# Patient Record
Sex: FEMALE | Race: BLACK OR AFRICAN AMERICAN | Employment: FULL TIME | URBAN - METROPOLITAN AREA
[De-identification: names, ages, dates, MRNs, and addresses within clinical notes are randomized per-mention and may not be internally consistent; named-entity substitution may affect disease eponyms.]

---

## 2017-01-07 ENCOUNTER — LAB ENCOUNTER (OUTPATIENT)
Dept: LAB | Age: 62
End: 2017-01-07
Attending: INTERNAL MEDICINE
Payer: COMMERCIAL

## 2017-01-07 DIAGNOSIS — D72.819 LEUKOPENIA, UNSPECIFIED TYPE: ICD-10-CM

## 2017-01-07 DIAGNOSIS — E87.0 SERUM SODIUM ELEVATED: ICD-10-CM

## 2017-01-07 DIAGNOSIS — N28.9 RENAL INSUFFICIENCY: ICD-10-CM

## 2017-01-07 LAB
ALBUMIN SERPL-MCNC: 4 G/DL (ref 3.5–4.8)
ALP LIVER SERPL-CCNC: 89 U/L (ref 50–130)
ALT SERPL-CCNC: 34 U/L (ref 14–54)
AST SERPL-CCNC: 28 U/L (ref 15–41)
BASOPHILS # BLD AUTO: 0.04 X10(3) UL (ref 0–0.1)
BASOPHILS NFR BLD AUTO: 1.1 %
BILIRUB SERPL-MCNC: 0.8 MG/DL (ref 0.1–2)
BUN BLD-MCNC: 17 MG/DL (ref 8–20)
CALCIUM BLD-MCNC: 9 MG/DL (ref 8.3–10.3)
CHLORIDE: 105 MMOL/L (ref 101–111)
CO2: 28 MMOL/L (ref 22–32)
CREAT BLD-MCNC: 1.28 MG/DL (ref 0.55–1.02)
EOSINOPHIL # BLD AUTO: 0.3 X10(3) UL (ref 0–0.3)
EOSINOPHIL NFR BLD AUTO: 8.5 %
ERYTHROCYTE [DISTWIDTH] IN BLOOD BY AUTOMATED COUNT: 14.3 % (ref 11.5–16)
GLUCOSE BLD-MCNC: 91 MG/DL (ref 70–99)
HCT VFR BLD AUTO: 46.8 % (ref 34–50)
HGB BLD-MCNC: 14.7 G/DL (ref 12–16)
IMMATURE GRANULOCYTE COUNT: 0.01 X10(3) UL (ref 0–1)
IMMATURE GRANULOCYTE RATIO %: 0.3 %
LYMPHOCYTES # BLD AUTO: 1.31 X10(3) UL (ref 0.9–4)
LYMPHOCYTES NFR BLD AUTO: 36.9 %
M PROTEIN MFR SERPL ELPH: 7.3 G/DL (ref 6.1–8.3)
MCH RBC QN AUTO: 28.4 PG (ref 27–33.2)
MCHC RBC AUTO-ENTMCNC: 31.4 G/DL (ref 31–37)
MCV RBC AUTO: 90.3 FL (ref 81–100)
MONOCYTES # BLD AUTO: 0.27 X10(3) UL (ref 0.1–0.6)
MONOCYTES NFR BLD AUTO: 7.6 %
NEUTROPHIL ABS PRELIM: 1.62 X10 (3) UL (ref 1.3–6.7)
NEUTROPHILS # BLD AUTO: 1.62 X10(3) UL (ref 1.3–6.7)
NEUTROPHILS NFR BLD AUTO: 45.6 %
PLATELET # BLD AUTO: 226 10(3)UL (ref 150–450)
POTASSIUM SERPL-SCNC: 4 MMOL/L (ref 3.6–5.1)
RBC # BLD AUTO: 5.18 X10(6)UL (ref 3.8–5.1)
RED CELL DISTRIBUTION WIDTH-SD: 46.9 FL (ref 35.1–46.3)
SODIUM SERPL-SCNC: 141 MMOL/L (ref 136–144)
WBC # BLD AUTO: 3.6 X10(3) UL (ref 4–13)

## 2017-01-07 PROCEDURE — 85025 COMPLETE CBC W/AUTO DIFF WBC: CPT

## 2017-01-07 PROCEDURE — 80053 COMPREHEN METABOLIC PANEL: CPT

## 2017-01-07 PROCEDURE — 36415 COLL VENOUS BLD VENIPUNCTURE: CPT

## 2017-01-09 RX ORDER — ERGOCALCIFEROL 1.25 MG/1
50000 CAPSULE ORAL WEEKLY
Qty: 48 CAPSULE | Refills: 0 | Status: SHIPPED | OUTPATIENT
Start: 2017-01-09 | End: 2017-02-08

## 2017-01-09 NOTE — TELEPHONE ENCOUNTER
[facility] is requesting a 90 day supply for Vit D 50,000IU instead to her local pharmacy. Ok per Dr. Wendy Rao to send a 90 day supply to [facility]. RX sent.     -Called [facility] ,spoke with Helio Whitlock, to cancel RX sent on 12/13/16.

## 2017-01-10 ENCOUNTER — PATIENT MESSAGE (OUTPATIENT)
Dept: INTERNAL MEDICINE CLINIC | Facility: CLINIC | Age: 62
End: 2017-01-10

## 2017-01-10 DIAGNOSIS — D72.819 LEUKOPENIA, UNSPECIFIED TYPE: Primary | ICD-10-CM

## 2017-01-13 NOTE — TELEPHONE ENCOUNTER
Called pt to inform confirmed with [facility] still available and will be ready for pick this weekend. Pt verbalized understanding, stating will  meds and start.    Pt requesting remaining 90 day refill be re[facility] and mo

## 2017-01-13 NOTE — TELEPHONE ENCOUNTER
[phone]   for pt (Nicholas Pedraza per HIPAA) inquiring pt's mychart reply \"For some reason the new perscription never made it to [facility]. I have not started the new medication. \"  Noted BP rx's sent to [facility] pharmacy on 12/19/16- Is pt aware and

## 2017-01-16 RX ORDER — BUPROPION HYDROCHLORIDE 300 MG/1
TABLET ORAL
Qty: 90 TABLET | Refills: 0 | Status: SHIPPED | OUTPATIENT
Start: 2017-01-16 | End: 2017-04-14

## 2017-01-16 NOTE — TELEPHONE ENCOUNTER
LM for pt at [facility] [phone] (H) cancelling appt for today 1/16/17, asking pt to see email response to hers and asking pt to call back to schedule.

## 2017-01-16 NOTE — TELEPHONE ENCOUNTER
Called pt's cell number. Informed her info listed below. Pt scheduled 3 weeks from now.    Future Appointments  Date Time Provider [facility]       [facility]   2/6/2017 9:45 AM Robert Ott MD [facility] IM

## 2017-01-17 ENCOUNTER — OFFICE VISIT (OUTPATIENT)
Dept: INTERNAL MEDICINE CLINIC | Facility: CLINIC | Age: 62
End: 2017-01-17

## 2017-01-17 VITALS
DIASTOLIC BLOOD PRESSURE: 76 MMHG | WEIGHT: 207 LBS | SYSTOLIC BLOOD PRESSURE: 122 MMHG | BODY MASS INDEX: 31.37 KG/M2 | RESPIRATION RATE: 16 BRPM | HEIGHT: 68 IN | HEART RATE: 78 BPM

## 2017-01-17 DIAGNOSIS — R79.89 ELEVATED SERUM CREATININE: ICD-10-CM

## 2017-01-17 DIAGNOSIS — Z51.81 ENCOUNTER FOR THERAPEUTIC DRUG MONITORING: Primary | ICD-10-CM

## 2017-01-17 DIAGNOSIS — E66.9 OBESITY (BMI 30-39.9): ICD-10-CM

## 2017-01-17 PROCEDURE — 99213 OFFICE O/P EST LOW 20 MIN: CPT | Performed by: INTERNAL MEDICINE

## 2017-01-17 RX ORDER — PHENTERMINE HYDROCHLORIDE 37.5 MG/1
37.5 TABLET ORAL
Qty: 30 TABLET | Refills: 0 | Status: SHIPPED | OUTPATIENT
Start: 2017-01-17 | End: 2017-02-14

## 2017-01-17 RX ORDER — TOPIRAMATE 25 MG/1
25 TABLET ORAL 2 TIMES DAILY
Qty: 60 TABLET | Refills: 5 | Status: SHIPPED | OUTPATIENT
Start: 2017-01-17 | End: 2017-03-14

## 2017-01-17 NOTE — PROGRESS NOTES
CC: Patient presents with:  Weight Check: down 2 lb       HPI:   Obesity, doing well on phentermine. Working out daily at work. Worsening cravings.        Current Outpatient Prescriptions:  Phentermine HCl 37.5 MG Oral Tab Take 1 tablet (37.5 mg total bilat   CARDIO: RRR without murmur      No orders of the defined types were placed in this encounter.         Signed Prescriptions Disp Refills    Phentermine HCl 37.5 MG Oral Tab 30 tablet 0      Sig: Take 1 tablet (37.5 mg total) by mouth every morning be

## 2017-02-04 ENCOUNTER — LAB ENCOUNTER (OUTPATIENT)
Dept: LAB | Age: 62
End: 2017-02-04
Attending: INTERNAL MEDICINE
Payer: COMMERCIAL

## 2017-02-04 DIAGNOSIS — Z13.0 SCREENING FOR BLOOD DISEASE: ICD-10-CM

## 2017-02-04 DIAGNOSIS — Z00.00 ROUTINE GENERAL MEDICAL EXAMINATION AT A HEALTH CARE FACILITY: ICD-10-CM

## 2017-02-04 DIAGNOSIS — Z13.220 SCREENING FOR LIPOID DISORDERS: ICD-10-CM

## 2017-02-04 DIAGNOSIS — D72.819 LEUKOPENIA, UNSPECIFIED TYPE: ICD-10-CM

## 2017-02-04 DIAGNOSIS — Z13.228 SCREENING FOR METABOLIC DISORDER: ICD-10-CM

## 2017-02-04 LAB
ALBUMIN SERPL-MCNC: 3.9 G/DL (ref 3.5–4.8)
ALP LIVER SERPL-CCNC: 100 U/L (ref 50–130)
ALT SERPL-CCNC: 31 U/L (ref 14–54)
AST SERPL-CCNC: 18 U/L (ref 15–41)
BASOPHILS # BLD AUTO: 0.07 X10(3) UL (ref 0–0.1)
BASOPHILS NFR BLD AUTO: 2.2 %
BILIRUB SERPL-MCNC: 0.7 MG/DL (ref 0.1–2)
BUN BLD-MCNC: 14 MG/DL (ref 8–20)
CALCIUM BLD-MCNC: 8.9 MG/DL (ref 8.3–10.3)
CHLORIDE: 108 MMOL/L (ref 101–111)
CHOLEST SMN-MCNC: 169 MG/DL (ref ?–200)
CO2: 27 MMOL/L (ref 22–32)
CREAT BLD-MCNC: 1.36 MG/DL (ref 0.55–1.02)
EOSINOPHIL # BLD AUTO: 0.22 X10(3) UL (ref 0–0.3)
EOSINOPHIL NFR BLD AUTO: 6.8 %
ERYTHROCYTE [DISTWIDTH] IN BLOOD BY AUTOMATED COUNT: 14.8 % (ref 11.5–16)
GLUCOSE BLD-MCNC: 83 MG/DL (ref 70–99)
HCT VFR BLD AUTO: 46.3 % (ref 34–50)
HDLC SERPL-MCNC: 70 MG/DL (ref 45–?)
HDLC SERPL: 2.41 {RATIO} (ref ?–4.44)
HGB BLD-MCNC: 14.4 G/DL (ref 12–16)
IMMATURE GRANULOCYTE COUNT: 0.01 X10(3) UL (ref 0–1)
IMMATURE GRANULOCYTE RATIO %: 0.3 %
LDLC SERPL CALC-MCNC: 92 MG/DL (ref ?–130)
LYMPHOCYTES # BLD AUTO: 0.97 X10(3) UL (ref 0.9–4)
LYMPHOCYTES NFR BLD AUTO: 30 %
M PROTEIN MFR SERPL ELPH: 7.1 G/DL (ref 6.1–8.3)
MCH RBC QN AUTO: 28.6 PG (ref 27–33.2)
MCHC RBC AUTO-ENTMCNC: 31.1 G/DL (ref 31–37)
MCV RBC AUTO: 91.9 FL (ref 81–100)
MONOCYTES # BLD AUTO: 0.27 X10(3) UL (ref 0.1–0.6)
MONOCYTES NFR BLD AUTO: 8.4 %
NEUTROPHIL ABS PRELIM: 1.69 X10 (3) UL (ref 1.3–6.7)
NEUTROPHILS # BLD AUTO: 1.69 X10(3) UL (ref 1.3–6.7)
NEUTROPHILS NFR BLD AUTO: 52.3 %
NONHDLC SERPL-MCNC: 99 MG/DL (ref ?–130)
PLATELET # BLD AUTO: 212 10(3)UL (ref 150–450)
POTASSIUM SERPL-SCNC: 4.1 MMOL/L (ref 3.6–5.1)
RBC # BLD AUTO: 5.04 X10(6)UL (ref 3.8–5.1)
RED CELL DISTRIBUTION WIDTH-SD: 49.7 FL (ref 35.1–46.3)
SODIUM SERPL-SCNC: 141 MMOL/L (ref 136–144)
TRIGLYCERIDES: 34 MG/DL (ref ?–150)
VLDL: 7 MG/DL (ref 5–40)
WBC # BLD AUTO: 3.2 X10(3) UL (ref 4–13)

## 2017-02-04 PROCEDURE — 85025 COMPLETE CBC W/AUTO DIFF WBC: CPT

## 2017-02-04 PROCEDURE — 80061 LIPID PANEL: CPT

## 2017-02-04 PROCEDURE — 80053 COMPREHEN METABOLIC PANEL: CPT

## 2017-02-04 PROCEDURE — 36415 COLL VENOUS BLD VENIPUNCTURE: CPT

## 2017-02-10 RX ORDER — TOLTERODINE 4 MG/1
CAPSULE, EXTENDED RELEASE ORAL
Qty: 90 CAPSULE | Refills: 1 | Status: SHIPPED | OUTPATIENT
Start: 2017-02-10 | End: 2017-08-08

## 2017-02-10 NOTE — TELEPHONE ENCOUNTER
LOV: 12/19/16  Future office visit: 12/13/17  Last labs: 2/4/17 Cmp Cbc Lipid  Last RX: 11/11/16 #90 No Refills  Per protocol: Route to Provider

## 2017-02-13 ENCOUNTER — TELEPHONE (OUTPATIENT)
Dept: INTERNAL MEDICINE CLINIC | Facility: CLINIC | Age: 62
End: 2017-02-13

## 2017-02-13 ENCOUNTER — LAB ENCOUNTER (OUTPATIENT)
Dept: LAB | Age: 62
End: 2017-02-13
Attending: INTERNAL MEDICINE
Payer: COMMERCIAL

## 2017-02-13 ENCOUNTER — OFFICE VISIT (OUTPATIENT)
Dept: INTERNAL MEDICINE CLINIC | Facility: CLINIC | Age: 62
End: 2017-02-13

## 2017-02-13 VITALS
WEIGHT: 202.38 LBS | SYSTOLIC BLOOD PRESSURE: 128 MMHG | HEART RATE: 72 BPM | HEIGHT: 68 IN | BODY MASS INDEX: 30.67 KG/M2 | RESPIRATION RATE: 16 BRPM | DIASTOLIC BLOOD PRESSURE: 80 MMHG | OXYGEN SATURATION: 99 % | TEMPERATURE: 98 F

## 2017-02-13 DIAGNOSIS — M79.672 LEFT FOOT PAIN: ICD-10-CM

## 2017-02-13 DIAGNOSIS — I10 ESSENTIAL HYPERTENSION: Primary | ICD-10-CM

## 2017-02-13 DIAGNOSIS — D72.819 LEUKOPENIA, UNSPECIFIED TYPE: ICD-10-CM

## 2017-02-13 DIAGNOSIS — I71.4 ABDOMINAL AORTIC ANEURYSM (AAA) WITHOUT RUPTURE (HCC): ICD-10-CM

## 2017-02-13 DIAGNOSIS — E53.8 LOW SERUM VITAMIN B12: ICD-10-CM

## 2017-02-13 LAB
25-HYDROXYVITAMIN D (TOTAL): 51.4 NG/ML (ref 30–100)
HAV AB SERPL IA-ACNC: 371 PG/ML (ref 193–986)

## 2017-02-13 PROCEDURE — 86255 FLUORESCENT ANTIBODY SCREEN: CPT

## 2017-02-13 PROCEDURE — 99214 OFFICE O/P EST MOD 30 MIN: CPT | Performed by: INTERNAL MEDICINE

## 2017-02-13 PROCEDURE — 82607 VITAMIN B-12: CPT

## 2017-02-13 PROCEDURE — 82784 ASSAY IGA/IGD/IGG/IGM EACH: CPT

## 2017-02-13 PROCEDURE — 83516 IMMUNOASSAY NONANTIBODY: CPT

## 2017-02-13 PROCEDURE — 82306 VITAMIN D 25 HYDROXY: CPT

## 2017-02-13 RX ORDER — HYDROCHLOROTHIAZIDE 12.5 MG/1
12.5 TABLET ORAL DAILY
Qty: 90 TABLET | Refills: 3 | Status: SHIPPED | OUTPATIENT
Start: 2017-02-13 | End: 2017-02-14

## 2017-02-13 NOTE — PROGRESS NOTES
HPI:    Patient ID: Rafael Torres is a 57year old female.     HPI  Here for htn fu and ckd creat better on lower dose hctz, overdue for us AAA, leukopenia seeing heme in past, co left foot dorsal pain at night, not during day, not reproducible, no weakne round, and reactive to light. Neck: Neck supple. Cardiovascular: Normal rate and regular rhythm. Pulmonary/Chest: Effort normal and breath sounds normal. She has no wheezes. Musculoskeletal: She exhibits no edema.    Neurological: She is oriented t

## 2017-02-13 NOTE — TELEPHONE ENCOUNTER
Received a refill request from [facility] for lisinopril and hydrochlorothiazide.  Just want to make sure she wants it to go there because she has refills at [facility] on file

## 2017-02-14 ENCOUNTER — OFFICE VISIT (OUTPATIENT)
Dept: INTERNAL MEDICINE CLINIC | Facility: CLINIC | Age: 62
End: 2017-02-14

## 2017-02-14 VITALS
RESPIRATION RATE: 16 BRPM | DIASTOLIC BLOOD PRESSURE: 60 MMHG | HEIGHT: 68 IN | HEART RATE: 84 BPM | SYSTOLIC BLOOD PRESSURE: 118 MMHG | BODY MASS INDEX: 30.92 KG/M2 | WEIGHT: 204 LBS

## 2017-02-14 DIAGNOSIS — E66.9 OBESITY (BMI 30-39.9): ICD-10-CM

## 2017-02-14 DIAGNOSIS — Z51.81 ENCOUNTER FOR THERAPEUTIC DRUG MONITORING: Primary | ICD-10-CM

## 2017-02-14 LAB — IMMUNOGLOBULIN A: 177 MG/DL (ref 70–312)

## 2017-02-14 PROCEDURE — 99213 OFFICE O/P EST LOW 20 MIN: CPT | Performed by: INTERNAL MEDICINE

## 2017-02-14 RX ORDER — TOPIRAMATE 50 MG/1
50 TABLET, FILM COATED ORAL 2 TIMES DAILY
Qty: 60 TABLET | Refills: 5 | Status: SHIPPED | OUTPATIENT
Start: 2017-02-14 | End: 2017-03-14

## 2017-02-14 RX ORDER — PHENTERMINE HYDROCHLORIDE 37.5 MG/1
37.5 TABLET ORAL
Qty: 30 TABLET | Refills: 0 | Status: SHIPPED | OUTPATIENT
Start: 2017-02-14 | End: 2017-03-14

## 2017-02-14 NOTE — PROGRESS NOTES
CC: Patient presents with:  Weight Check: down 3 lb       HPI:   Obesity, doing well on phentermine and topamax, still worsening hunger and cravings. No chest pain.        Current Outpatient Prescriptions:  hydrochlorothiazide 12.5 MG Oral Tab Take 1 ta PERRLA  LUNGS: clear to auscultation bilat   CARDIO: RRR without murmur    No orders of the defined types were placed in this encounter.         Signed Prescriptions Disp Refills    Phentermine HCl 37.5 MG Oral Tab 30 tablet 0      Sig: Take 1 tablet (37.5

## 2017-02-16 ENCOUNTER — PATIENT MESSAGE (OUTPATIENT)
Dept: INTERNAL MEDICINE CLINIC | Facility: CLINIC | Age: 62
End: 2017-02-16

## 2017-02-16 DIAGNOSIS — D70.9 NEUTROPENIA, UNSPECIFIED TYPE (HCC): Primary | ICD-10-CM

## 2017-02-16 NOTE — TELEPHONE ENCOUNTER
From: Melany Cisneros  To: Vonnie Whitfield MD  Sent: 2/16/2017 10:22 AM CST  Subject: Visit Follow-up Question    Dr. Allison Sidhu,    I contacted Dr. Debbie Steele as you suggested about my white blood cell count.  She recommends I get retested in a month and if th

## 2017-02-17 LAB
GLIADIN PEPTIDE ANTIBODY, IGA DEAMIDATED: 5 UNITS
GLIADIN PEPTIDE ANTIBODY, IGG DEAMIDATED: 4 UNITS

## 2017-02-20 RX ORDER — LISINOPRIL 20 MG/1
20 TABLET ORAL DAILY
Qty: 90 TABLET | Refills: 1 | Status: SHIPPED | OUTPATIENT
Start: 2017-02-20 | End: 2017-07-31

## 2017-02-21 LAB — TISSUE TRANSGLUTAMINASE AB,IGA: 1.7 U/ML (ref ?–15)

## 2017-02-23 ENCOUNTER — TELEPHONE (OUTPATIENT)
Dept: INTERNAL MEDICINE CLINIC | Facility: CLINIC | Age: 62
End: 2017-02-23

## 2017-02-23 DIAGNOSIS — I71.4 ABDOMINAL AORTIC ANEURYSM (AAA) WITHOUT RUPTURE (HCC): Primary | ICD-10-CM

## 2017-02-25 ENCOUNTER — HOSPITAL ENCOUNTER (OUTPATIENT)
Dept: ULTRASOUND IMAGING | Age: 62
Discharge: HOME | End: 2017-02-25
Attending: INTERNAL MEDICINE
Payer: COMMERCIAL

## 2017-02-25 DIAGNOSIS — I71.4 ABDOMINAL AORTIC ANEURYSM (AAA) WITHOUT RUPTURE (HCC): ICD-10-CM

## 2017-02-25 PROCEDURE — 76706 US ABDL AORTA SCREEN AAA: CPT

## 2017-02-27 ENCOUNTER — PATIENT MESSAGE (OUTPATIENT)
Dept: INTERNAL MEDICINE CLINIC | Facility: CLINIC | Age: 62
End: 2017-02-27

## 2017-02-27 NOTE — TELEPHONE ENCOUNTER
From: Brittney Leal  To:  Genesis Partida MD  Sent: 2/27/2017 12:29 PM CST  Subject: Visit Follow-up Question     Referral for the test below:      [facility] ([address]) De[facility] HEMATOLOGY/ONCOL

## 2017-03-14 ENCOUNTER — TELEPHONE (OUTPATIENT)
Dept: INTERNAL MEDICINE CLINIC | Facility: CLINIC | Age: 62
End: 2017-03-14

## 2017-03-14 ENCOUNTER — OFFICE VISIT (OUTPATIENT)
Dept: INTERNAL MEDICINE CLINIC | Facility: CLINIC | Age: 62
End: 2017-03-14

## 2017-03-14 VITALS
HEART RATE: 78 BPM | DIASTOLIC BLOOD PRESSURE: 76 MMHG | WEIGHT: 199 LBS | BODY MASS INDEX: 30.16 KG/M2 | RESPIRATION RATE: 16 BRPM | SYSTOLIC BLOOD PRESSURE: 122 MMHG | HEIGHT: 68 IN

## 2017-03-14 DIAGNOSIS — E66.9 OBESITY (BMI 30-39.9): ICD-10-CM

## 2017-03-14 DIAGNOSIS — Z51.81 ENCOUNTER FOR THERAPEUTIC DRUG MONITORING: Primary | ICD-10-CM

## 2017-03-14 PROCEDURE — 99213 OFFICE O/P EST LOW 20 MIN: CPT | Performed by: INTERNAL MEDICINE

## 2017-03-14 RX ORDER — PHENTERMINE HYDROCHLORIDE 37.5 MG/1
37.5 TABLET ORAL
Qty: 30 TABLET | Refills: 0 | Status: SHIPPED | OUTPATIENT
Start: 2017-03-14 | End: 2017-04-07

## 2017-03-14 NOTE — PROGRESS NOTES
CC: Patient presents with:  Weight Check: down 5 lb       HPI:   Obesity, doing well on phentermine. Stopped topamax due to blurry vision. Worsening hunger.        Current Outpatient Prescriptions:  Phentermine HCl 37.5 MG Oral Tab Take 1 tablet (37.5 encounter.         Signed Prescriptions Disp Refills    Phentermine HCl 37.5 MG Oral Tab 30 tablet 0      Sig: Take 1 tablet (37.5 mg total) by mouth every morning before breakfast.      Lorcaserin HCl ER (BELVIQ XR) 20 MG Oral Tablet 24 Hr 30 tablet 0

## 2017-03-15 NOTE — TELEPHONE ENCOUNTER
Left a VM (at preferred number  [phone] )-  Awaiting for Pt to call back to go over recommendations.

## 2017-03-16 PROBLEM — R29.898 POPLITEAL FULLNESS: Status: ACTIVE | Noted: 2017-03-16

## 2017-03-18 ENCOUNTER — LAB ENCOUNTER (OUTPATIENT)
Dept: LAB | Age: 62
End: 2017-03-18
Attending: INTERNAL MEDICINE
Payer: COMMERCIAL

## 2017-03-18 DIAGNOSIS — D70.9 NEUTROPENIA, UNSPECIFIED TYPE (HCC): ICD-10-CM

## 2017-03-18 LAB
BASOPHILS # BLD AUTO: 0.04 X10(3) UL (ref 0–0.1)
BASOPHILS NFR BLD AUTO: 1.3 %
EOSINOPHIL # BLD AUTO: 0.15 X10(3) UL (ref 0–0.3)
EOSINOPHIL NFR BLD AUTO: 4.8 %
ERYTHROCYTE [DISTWIDTH] IN BLOOD BY AUTOMATED COUNT: 14.6 % (ref 11.5–16)
HCT VFR BLD AUTO: 46.1 % (ref 34–50)
HGB BLD-MCNC: 14.5 G/DL (ref 12–16)
IMMATURE GRANULOCYTE COUNT: 0 X10(3) UL (ref 0–1)
IMMATURE GRANULOCYTE RATIO %: 0 %
LYMPHOCYTES # BLD AUTO: 1.1 X10(3) UL (ref 0.9–4)
LYMPHOCYTES NFR BLD AUTO: 35.3 %
MCH RBC QN AUTO: 29 PG (ref 27–33.2)
MCHC RBC AUTO-ENTMCNC: 31.5 G/DL (ref 31–37)
MCV RBC AUTO: 92.2 FL (ref 81–100)
MONOCYTES # BLD AUTO: 0.24 X10(3) UL (ref 0.1–0.6)
MONOCYTES NFR BLD AUTO: 7.7 %
NEUTROPHIL ABS PRELIM: 1.59 X10 (3) UL (ref 1.3–6.7)
NEUTROPHILS # BLD AUTO: 1.59 X10(3) UL (ref 1.3–6.7)
NEUTROPHILS NFR BLD AUTO: 50.9 %
PLATELET # BLD AUTO: 222 10(3)UL (ref 150–450)
RBC # BLD AUTO: 5 X10(6)UL (ref 3.8–5.1)
RED CELL DISTRIBUTION WIDTH-SD: 49.3 FL (ref 35.1–46.3)
WBC # BLD AUTO: 3.1 X10(3) UL (ref 4–13)

## 2017-03-18 PROCEDURE — 36415 COLL VENOUS BLD VENIPUNCTURE: CPT

## 2017-03-18 PROCEDURE — 85025 COMPLETE CBC W/AUTO DIFF WBC: CPT

## 2017-03-21 ENCOUNTER — TELEPHONE (OUTPATIENT)
Dept: HEMATOLOGY/ONCOLOGY | Facility: HOSPITAL | Age: 62
End: 2017-03-21

## 2017-03-21 NOTE — TELEPHONE ENCOUNTER
Marlyn Booth MD  P Ed[facility] Brenda Rosales Rns                     Call, plt are stable. WBC bit low. Fredrick repeat cbc 1 month      Unable to reach pt by phone, left VM requesting pt to call back RN triage line.

## 2017-03-23 ENCOUNTER — APPOINTMENT (OUTPATIENT)
Dept: HEMATOLOGY/ONCOLOGY | Age: 62
End: 2017-03-23
Attending: INTERNAL MEDICINE
Payer: COMMERCIAL

## 2017-04-07 ENCOUNTER — OFFICE VISIT (OUTPATIENT)
Dept: INTERNAL MEDICINE CLINIC | Facility: CLINIC | Age: 62
End: 2017-04-07

## 2017-04-07 VITALS
DIASTOLIC BLOOD PRESSURE: 70 MMHG | BODY MASS INDEX: 29.86 KG/M2 | WEIGHT: 197 LBS | HEIGHT: 68 IN | HEART RATE: 88 BPM | SYSTOLIC BLOOD PRESSURE: 122 MMHG | RESPIRATION RATE: 16 BRPM

## 2017-04-07 DIAGNOSIS — E66.9 OBESITY (BMI 30-39.9): ICD-10-CM

## 2017-04-07 DIAGNOSIS — Z51.81 ENCOUNTER FOR THERAPEUTIC DRUG MONITORING: Primary | ICD-10-CM

## 2017-04-07 DIAGNOSIS — E53.8 VITAMIN B12 DEFICIENCY: ICD-10-CM

## 2017-04-07 PROCEDURE — 99213 OFFICE O/P EST LOW 20 MIN: CPT | Performed by: INTERNAL MEDICINE

## 2017-04-07 PROCEDURE — 96372 THER/PROPH/DIAG INJ SC/IM: CPT | Performed by: INTERNAL MEDICINE

## 2017-04-07 RX ORDER — PHENTERMINE HYDROCHLORIDE 37.5 MG/1
37.5 TABLET ORAL
Qty: 30 TABLET | Refills: 0 | Status: SHIPPED | OUTPATIENT
Start: 2017-04-07 | End: 2017-08-08

## 2017-04-07 RX ORDER — METFORMIN HYDROCHLORIDE 750 MG/1
750 TABLET, EXTENDED RELEASE ORAL
Qty: 30 TABLET | Refills: 5 | Status: SHIPPED | OUTPATIENT
Start: 2017-04-07 | End: 2017-09-12

## 2017-04-07 RX ORDER — CYANOCOBALAMIN 1000 UG/ML
1000 INJECTION INTRAMUSCULAR; SUBCUTANEOUS ONCE
Status: COMPLETED | OUTPATIENT
Start: 2017-04-07 | End: 2017-04-07

## 2017-04-07 RX ORDER — ERGOCALCIFEROL 1.25 MG/1
CAPSULE ORAL
COMMUNITY
Start: 2017-03-22 | End: 2017-10-27

## 2017-04-07 RX ADMIN — CYANOCOBALAMIN 1000 MCG: 1000 INJECTION INTRAMUSCULAR; SUBCUTANEOUS at 11:10:00

## 2017-04-07 NOTE — PROGRESS NOTES
CC: Patient presents with:  Weight Check: down 2 lb       HPI:   Obesity, doing well on phentermine and belviq. Doesn't feel belviq is helping much. No chest pain.        Current Outpatient Prescriptions:  Phentermine HCl 37.5 MG Oral Tab Take 1 tablet 122/70 mmHg  Pulse 88  Resp 16  Ht 68\"  Wt 197 lb  BMI 29.96 kg/m2  GENERAL: well developed, well nourished and in no apparent distress, A/O x3  HEENT: PERRLA  LUNGS: clear to auscultation bilat   CARDIO: RRR without murmur  GI: +BS's    No orders of the

## 2017-04-11 ENCOUNTER — TELEPHONE (OUTPATIENT)
Dept: INTERNAL MEDICINE CLINIC | Facility: CLINIC | Age: 62
End: 2017-04-11

## 2017-04-17 RX ORDER — BUPROPION HYDROCHLORIDE 300 MG/1
TABLET ORAL
Qty: 90 TABLET | Refills: 0 | Status: SHIPPED | OUTPATIENT
Start: 2017-04-17 | End: 2017-07-16

## 2017-04-17 NOTE — TELEPHONE ENCOUNTER
LOV: 2/13/17  Future office visit: 5/15/17   Last labs: 2/4/17 Cmp Cbc Lipid   Last RX: 1/16/17 #90 No Refills  Per protocol: Route to provider

## 2017-04-21 ENCOUNTER — NURSE ONLY (OUTPATIENT)
Dept: HEMATOLOGY/ONCOLOGY | Age: 62
End: 2017-04-21
Attending: INTERNAL MEDICINE
Payer: COMMERCIAL

## 2017-04-21 ENCOUNTER — TELEPHONE (OUTPATIENT)
Dept: HEMATOLOGY/ONCOLOGY | Facility: HOSPITAL | Age: 62
End: 2017-04-21

## 2017-04-21 DIAGNOSIS — D72.819 LEUKOPENIA, UNSPECIFIED TYPE: ICD-10-CM

## 2017-04-21 PROCEDURE — 36415 COLL VENOUS BLD VENIPUNCTURE: CPT

## 2017-04-21 PROCEDURE — 85025 COMPLETE CBC W/AUTO DIFF WBC: CPT

## 2017-04-21 NOTE — TELEPHONE ENCOUNTER
Keely Davis MD  P [facility] Bcn Bobby Rns                     Call, wbc normal, may repeat cbc in 4-6 months       Left VM with instructions and number to call and schedule an appointment.

## 2017-05-15 ENCOUNTER — OFFICE VISIT (OUTPATIENT)
Dept: INTERNAL MEDICINE CLINIC | Facility: CLINIC | Age: 62
End: 2017-05-15

## 2017-05-15 VITALS
HEIGHT: 68 IN | RESPIRATION RATE: 16 BRPM | HEART RATE: 74 BPM | TEMPERATURE: 98 F | WEIGHT: 189 LBS | DIASTOLIC BLOOD PRESSURE: 84 MMHG | SYSTOLIC BLOOD PRESSURE: 114 MMHG | BODY MASS INDEX: 28.64 KG/M2

## 2017-05-15 DIAGNOSIS — I71.4 ABDOMINAL AORTIC ANEURYSM (AAA) WITHOUT RUPTURE (HCC): ICD-10-CM

## 2017-05-15 DIAGNOSIS — Z00.00 ROUTINE GENERAL MEDICAL EXAMINATION AT A HEALTH CARE FACILITY: Primary | ICD-10-CM

## 2017-05-15 DIAGNOSIS — I10 ESSENTIAL HYPERTENSION: ICD-10-CM

## 2017-05-15 DIAGNOSIS — E53.8 B12 DEFICIENCY: ICD-10-CM

## 2017-05-15 DIAGNOSIS — Z12.39 BREAST CANCER SCREENING: ICD-10-CM

## 2017-05-15 PROCEDURE — 99396 PREV VISIT EST AGE 40-64: CPT | Performed by: INTERNAL MEDICINE

## 2017-05-15 PROCEDURE — 99212 OFFICE O/P EST SF 10 MIN: CPT | Performed by: INTERNAL MEDICINE

## 2017-05-15 NOTE — PROGRESS NOTES
HPI:    Patient ID: Preston Velazquez is a 57year old female.     HPI  Here for pe, no acute complaints, working hard on wt loss with wt loss clinic, ho AAA has seen pv, recomending yearly us, states hearing is decreasing, wants to see an audiologist, also, oriented to person, place, and time. She appears well-developed and well-nourished. HENT:   Head: Normocephalic and atraumatic. Right Ear: External ear normal.   Left Ear: External ear normal.   Mouth/Throat: No oropharyngeal exudate.    Eyes: Pupils ar

## 2017-05-16 ENCOUNTER — TELEPHONE (OUTPATIENT)
Dept: INTERNAL MEDICINE CLINIC | Facility: CLINIC | Age: 62
End: 2017-05-16

## 2017-05-16 ENCOUNTER — OFFICE VISIT (OUTPATIENT)
Dept: INTERNAL MEDICINE CLINIC | Facility: CLINIC | Age: 62
End: 2017-05-16

## 2017-05-16 VITALS
SYSTOLIC BLOOD PRESSURE: 122 MMHG | WEIGHT: 191 LBS | HEART RATE: 78 BPM | DIASTOLIC BLOOD PRESSURE: 78 MMHG | HEIGHT: 68 IN | RESPIRATION RATE: 16 BRPM | BODY MASS INDEX: 28.95 KG/M2

## 2017-05-16 DIAGNOSIS — E53.8 VITAMIN B 12 DEFICIENCY: ICD-10-CM

## 2017-05-16 DIAGNOSIS — Z51.81 ENCOUNTER FOR THERAPEUTIC DRUG MONITORING: Primary | ICD-10-CM

## 2017-05-16 DIAGNOSIS — E66.9 OBESITY (BMI 30-39.9): ICD-10-CM

## 2017-05-16 PROCEDURE — 99213 OFFICE O/P EST LOW 20 MIN: CPT | Performed by: INTERNAL MEDICINE

## 2017-05-16 PROCEDURE — 96372 THER/PROPH/DIAG INJ SC/IM: CPT | Performed by: INTERNAL MEDICINE

## 2017-05-16 RX ORDER — PHENTERMINE HYDROCHLORIDE 37.5 MG/1
37.5 TABLET ORAL
Qty: 30 TABLET | Refills: 0 | Status: CANCELLED | OUTPATIENT
Start: 2017-05-16

## 2017-05-16 RX ORDER — PHENTERMINE HYDROCHLORIDE 15 MG/1
15 CAPSULE ORAL EVERY MORNING
Qty: 30 CAPSULE | Refills: 2 | Status: SHIPPED | OUTPATIENT
Start: 2017-05-16 | End: 2017-09-12

## 2017-05-16 RX ORDER — CYANOCOBALAMIN 1000 UG/ML
1000 INJECTION INTRAMUSCULAR; SUBCUTANEOUS ONCE
Status: COMPLETED | OUTPATIENT
Start: 2017-05-16 | End: 2017-05-16

## 2017-05-16 RX ADMIN — CYANOCOBALAMIN 1000 MCG: 1000 INJECTION INTRAMUSCULAR; SUBCUTANEOUS at 13:29:00

## 2017-05-16 NOTE — PROGRESS NOTES
CC: Patient presents with:  Weight Check: down 6 lb       HPI:   Obesity, doing well on phentermine and metformin. No chest pain. Working out well.        Current Outpatient Prescriptions:  Phentermine HCl 15 MG Oral Cap Take 1 capsule (15 mg total) by in no apparent distress, A/O x3  HEENT:  PERRLA  LUNGS: clear to auscultation bilat   CARDIO: RRR without murmur    No orders of the defined types were placed in this encounter.         Signed Prescriptions Disp Refills    Phentermine HCl 15 MG Oral Cap 30

## 2017-05-16 NOTE — TELEPHONE ENCOUNTER
Per JL- Called Pt to inform that VERONICA recommends Dr Ubaldo Wright in [facility]  Pt verbalized understanding and had no further questions.

## 2017-06-07 ENCOUNTER — PATIENT MESSAGE (OUTPATIENT)
Dept: INTERNAL MEDICINE CLINIC | Facility: CLINIC | Age: 62
End: 2017-06-07

## 2017-06-07 NOTE — TELEPHONE ENCOUNTER
From: Pablo Yañez  To:  Marleny Johns MD  Sent: 6/7/2017 3:49 PM CDT  Subject: Non-Urgent Medical Question    Doctor,  For the past 7 days, I have been suffering with; sore throat, stuffy nose slight headaches between my eyes and painful sensation i

## 2017-06-09 ENCOUNTER — PATIENT MESSAGE (OUTPATIENT)
Dept: INTERNAL MEDICINE CLINIC | Facility: CLINIC | Age: 62
End: 2017-06-09

## 2017-06-09 ENCOUNTER — CHARTING TRANS (OUTPATIENT)
Dept: OTHER | Age: 62
End: 2017-06-09

## 2017-06-09 NOTE — TELEPHONE ENCOUNTER
From: Wilbert Brown  To:  Robert Li MD  Sent: 6/9/2017 12:52 PM CDT  Subject: Other    I sent a message earlier this week regarding flu like symptoms, please let me know if I can see either the nurse or doctor this afternoon for a RX for antibioti

## 2017-06-15 ENCOUNTER — NURSE ONLY (OUTPATIENT)
Dept: INTERNAL MEDICINE CLINIC | Facility: CLINIC | Age: 62
End: 2017-06-15

## 2017-06-15 DIAGNOSIS — E53.8 VITAMIN B 12 DEFICIENCY: Primary | ICD-10-CM

## 2017-06-15 PROCEDURE — 96372 THER/PROPH/DIAG INJ SC/IM: CPT | Performed by: NURSE PRACTITIONER

## 2017-06-15 RX ORDER — CYANOCOBALAMIN 1000 UG/ML
1000 INJECTION INTRAMUSCULAR; SUBCUTANEOUS ONCE
Status: COMPLETED | OUTPATIENT
Start: 2017-06-15 | End: 2017-06-15

## 2017-06-15 RX ADMIN — CYANOCOBALAMIN 1000 MCG: 1000 INJECTION INTRAMUSCULAR; SUBCUTANEOUS at 12:16:00

## 2017-07-17 RX ORDER — BUPROPION HYDROCHLORIDE 300 MG/1
300 TABLET ORAL DAILY
Qty: 90 TABLET | Refills: 1 | Status: SHIPPED | OUTPATIENT
Start: 2017-07-17 | End: 2018-01-25

## 2017-07-17 NOTE — TELEPHONE ENCOUNTER
Last Office Visit: 5-15-17 with JL for CPE   Last Rx Filled: 4-17-17 90 tabs with no refills   Last Labs: 2-4-17 cbc/cmp/lipid  Future Appointment: none     Per protocol to provider

## 2017-08-01 RX ORDER — LISINOPRIL 20 MG/1
TABLET ORAL
Qty: 90 TABLET | Refills: 0 | Status: SHIPPED | OUTPATIENT
Start: 2017-08-01 | End: 2017-10-29

## 2017-08-08 ENCOUNTER — OFFICE VISIT (OUTPATIENT)
Dept: INTERNAL MEDICINE CLINIC | Facility: CLINIC | Age: 62
End: 2017-08-08

## 2017-08-08 VITALS
HEIGHT: 68 IN | BODY MASS INDEX: 28.64 KG/M2 | WEIGHT: 189 LBS | RESPIRATION RATE: 16 BRPM | SYSTOLIC BLOOD PRESSURE: 122 MMHG | HEART RATE: 80 BPM | DIASTOLIC BLOOD PRESSURE: 80 MMHG

## 2017-08-08 DIAGNOSIS — Z51.81 ENCOUNTER FOR THERAPEUTIC DRUG MONITORING: Primary | ICD-10-CM

## 2017-08-08 DIAGNOSIS — E53.8 VITAMIN B 12 DEFICIENCY: ICD-10-CM

## 2017-08-08 DIAGNOSIS — E66.9 OBESITY (BMI 30-39.9): ICD-10-CM

## 2017-08-08 PROCEDURE — 99213 OFFICE O/P EST LOW 20 MIN: CPT | Performed by: INTERNAL MEDICINE

## 2017-08-08 PROCEDURE — 96372 THER/PROPH/DIAG INJ SC/IM: CPT | Performed by: INTERNAL MEDICINE

## 2017-08-08 RX ORDER — PHENTERMINE HYDROCHLORIDE 37.5 MG/1
37.5 TABLET ORAL
Qty: 30 TABLET | Refills: 0 | Status: SHIPPED | OUTPATIENT
Start: 2017-08-08 | End: 2017-09-12

## 2017-08-08 RX ORDER — CYANOCOBALAMIN 1000 UG/ML
1000 INJECTION INTRAMUSCULAR; SUBCUTANEOUS ONCE
Status: COMPLETED | OUTPATIENT
Start: 2017-08-08 | End: 2017-08-08

## 2017-08-08 RX ADMIN — CYANOCOBALAMIN 1000 MCG: 1000 INJECTION INTRAMUSCULAR; SUBCUTANEOUS at 11:51:00

## 2017-08-08 NOTE — PROGRESS NOTES
CC: Patient presents with:  Weight Check: down 2 lb       HPI:   Obesity, doing well on phentermine and metformin. No chest pain.        Current Outpatient Prescriptions:  Phentermine HCl 37.5 MG Oral Tab Take 1 tablet (37.5 mg total) by mouth every mor PERRLA  LUNGS: clear to auscultation bilat   CARDIO: RRR without murmur    No orders of the defined types were placed in this encounter.        Signed Prescriptions Disp Refills    Phentermine HCl 37.5 MG Oral Tab 30 tablet 0      Sig: Take 1 tablet (37.5 m

## 2017-08-09 RX ORDER — OMEPRAZOLE 10 MG/1
CAPSULE, DELAYED RELEASE ORAL
Qty: 90 CAPSULE | Refills: 2 | Status: SHIPPED | OUTPATIENT
Start: 2017-08-09 | End: 2018-01-25

## 2017-08-09 RX ORDER — TOLTERODINE 4 MG/1
CAPSULE, EXTENDED RELEASE ORAL
Qty: 90 CAPSULE | Refills: 1 | Status: SHIPPED | OUTPATIENT
Start: 2017-08-09 | End: 2018-01-25

## 2017-09-12 ENCOUNTER — OFFICE VISIT (OUTPATIENT)
Dept: INTERNAL MEDICINE CLINIC | Facility: CLINIC | Age: 62
End: 2017-09-12

## 2017-09-12 VITALS
HEIGHT: 68 IN | WEIGHT: 188 LBS | BODY MASS INDEX: 28.49 KG/M2 | DIASTOLIC BLOOD PRESSURE: 76 MMHG | RESPIRATION RATE: 16 BRPM | HEART RATE: 78 BPM | SYSTOLIC BLOOD PRESSURE: 124 MMHG

## 2017-09-12 DIAGNOSIS — Z51.81 ENCOUNTER FOR THERAPEUTIC DRUG MONITORING: Primary | ICD-10-CM

## 2017-09-12 DIAGNOSIS — E53.8 VITAMIN B 12 DEFICIENCY: ICD-10-CM

## 2017-09-12 DIAGNOSIS — E66.9 OBESITY (BMI 30-39.9): ICD-10-CM

## 2017-09-12 PROCEDURE — 99213 OFFICE O/P EST LOW 20 MIN: CPT | Performed by: INTERNAL MEDICINE

## 2017-09-12 PROCEDURE — 96372 THER/PROPH/DIAG INJ SC/IM: CPT | Performed by: INTERNAL MEDICINE

## 2017-09-12 RX ORDER — METFORMIN HYDROCHLORIDE 750 MG/1
1500 TABLET, EXTENDED RELEASE ORAL
Qty: 60 TABLET | Refills: 5 | Status: SHIPPED | OUTPATIENT
Start: 2017-09-12 | End: 2018-05-21

## 2017-09-12 RX ORDER — CYANOCOBALAMIN 1000 UG/ML
1000 INJECTION INTRAMUSCULAR; SUBCUTANEOUS ONCE
Status: COMPLETED | OUTPATIENT
Start: 2017-09-12 | End: 2017-09-12

## 2017-09-12 RX ORDER — PHENTERMINE HYDROCHLORIDE 37.5 MG/1
37.5 TABLET ORAL
Qty: 30 TABLET | Refills: 0 | Status: SHIPPED | OUTPATIENT
Start: 2017-09-12 | End: 2017-10-05

## 2017-09-12 RX ADMIN — CYANOCOBALAMIN 1000 MCG: 1000 INJECTION INTRAMUSCULAR; SUBCUTANEOUS at 12:28:00

## 2017-09-12 NOTE — PROGRESS NOTES
CC: Patient presents with:  Weight Check: down 1 lb       HPI:   Obesity, doing well on phentermine and metformin. Working out well. No chest pain. Some worsening hunger and cravings.        Current Outpatient Prescriptions:  Phentermine HCl 37.5 MG Ora orders of the defined types were placed in this encounter.        Signed Prescriptions Disp Refills    Phentermine HCl 37.5 MG Oral Tab 30 tablet 0      Sig: Take 1 tablet (37.5 mg total) by mouth every morning before breakfast.      MetFORMIN HCl  MG

## 2017-09-19 RX ORDER — ERGOCALCIFEROL 1.25 MG/1
CAPSULE ORAL
Qty: 48 CAPSULE | Refills: 0 | OUTPATIENT
Start: 2017-09-19

## 2017-09-30 ENCOUNTER — LAB ENCOUNTER (OUTPATIENT)
Dept: LAB | Age: 62
End: 2017-09-30
Attending: INTERNAL MEDICINE
Payer: COMMERCIAL

## 2017-09-30 DIAGNOSIS — E53.8 B12 DEFICIENCY: ICD-10-CM

## 2017-09-30 DIAGNOSIS — I10 ESSENTIAL HYPERTENSION: ICD-10-CM

## 2017-09-30 DIAGNOSIS — D70.9 NEUTROPENIA, UNSPECIFIED TYPE (HCC): ICD-10-CM

## 2017-09-30 DIAGNOSIS — E53.8 LOW SERUM VITAMIN B12: ICD-10-CM

## 2017-09-30 PROCEDURE — 80053 COMPREHEN METABOLIC PANEL: CPT | Performed by: INTERNAL MEDICINE

## 2017-09-30 PROCEDURE — 36415 COLL VENOUS BLD VENIPUNCTURE: CPT | Performed by: INTERNAL MEDICINE

## 2017-09-30 PROCEDURE — 82607 VITAMIN B-12: CPT | Performed by: INTERNAL MEDICINE

## 2017-10-03 ENCOUNTER — TELEPHONE (OUTPATIENT)
Dept: HEMATOLOGY/ONCOLOGY | Facility: HOSPITAL | Age: 62
End: 2017-10-03

## 2017-10-03 ENCOUNTER — OFFICE VISIT (OUTPATIENT)
Dept: INTERNAL MEDICINE CLINIC | Facility: CLINIC | Age: 62
End: 2017-10-03

## 2017-10-03 VITALS
RESPIRATION RATE: 16 BRPM | HEIGHT: 68 IN | DIASTOLIC BLOOD PRESSURE: 68 MMHG | SYSTOLIC BLOOD PRESSURE: 118 MMHG | HEART RATE: 72 BPM | WEIGHT: 191 LBS | BODY MASS INDEX: 28.95 KG/M2

## 2017-10-03 DIAGNOSIS — R35.0 URINARY FREQUENCY: ICD-10-CM

## 2017-10-03 DIAGNOSIS — M79.672 LEFT FOOT PAIN: ICD-10-CM

## 2017-10-03 DIAGNOSIS — F32.A DEPRESSION, UNSPECIFIED DEPRESSION TYPE: ICD-10-CM

## 2017-10-03 DIAGNOSIS — H91.93 DECREASED HEARING OF BOTH EARS: Primary | ICD-10-CM

## 2017-10-03 DIAGNOSIS — E55.9 VITAMIN D DEFICIENCY: ICD-10-CM

## 2017-10-03 PROCEDURE — 81003 URINALYSIS AUTO W/O SCOPE: CPT | Performed by: INTERNAL MEDICINE

## 2017-10-03 PROCEDURE — 99214 OFFICE O/P EST MOD 30 MIN: CPT | Performed by: INTERNAL MEDICINE

## 2017-10-03 RX ORDER — ESCITALOPRAM OXALATE 5 MG/1
5 TABLET ORAL DAILY
Qty: 90 TABLET | Refills: 0 | Status: SHIPPED | OUTPATIENT
Start: 2017-10-03 | End: 2017-12-20

## 2017-10-03 NOTE — PROGRESS NOTES
HPI:    Patient ID: Melany Cisneros is a 57year old female.     HPI  Here made apt for med fu but multiple complaints, depression and anxiety worse, htn, co intermittant urinary freq, worse at night, less during day, problems with focus, goes to wt loss, c PHYSICAL EXAM:   Physical Exam   Constitutional: She is oriented to person, place, and time. She appears well-developed and well-nourished. HENT:   Head: Normocephalic and atraumatic.    Right Ear: External ear normal.   Left Ear: External ear normal.

## 2017-10-03 NOTE — TELEPHONE ENCOUNTER
Tawanna Frias MD  P [facility] B[facility] Joshua Ramirez Rns             Call, wbc normal. F/u with PCP. May f/u with me in future if WBC low again      Unable to reach pt by phone, left VM requesting pt to call back RN triage line.

## 2017-10-05 ENCOUNTER — OFFICE VISIT (OUTPATIENT)
Dept: INTERNAL MEDICINE CLINIC | Facility: CLINIC | Age: 62
End: 2017-10-05

## 2017-10-05 VITALS
RESPIRATION RATE: 16 BRPM | HEART RATE: 78 BPM | WEIGHT: 189 LBS | SYSTOLIC BLOOD PRESSURE: 122 MMHG | DIASTOLIC BLOOD PRESSURE: 76 MMHG | HEIGHT: 68 IN | BODY MASS INDEX: 28.64 KG/M2

## 2017-10-05 DIAGNOSIS — Z51.81 ENCOUNTER FOR THERAPEUTIC DRUG MONITORING: Primary | ICD-10-CM

## 2017-10-05 DIAGNOSIS — E66.9 OBESITY (BMI 30-39.9): ICD-10-CM

## 2017-10-05 PROCEDURE — 99213 OFFICE O/P EST LOW 20 MIN: CPT | Performed by: INTERNAL MEDICINE

## 2017-10-05 RX ORDER — BUPROPION HYDROCHLORIDE 150 MG/1
150 TABLET ORAL DAILY
Qty: 30 TABLET | Refills: 0 | Status: SHIPPED | OUTPATIENT
Start: 2017-10-05 | End: 2017-10-27

## 2017-10-05 RX ORDER — PHENDIMETRAZINE TARTRATE 105 MG/1
1 CAPSULE, EXTENDED RELEASE ORAL DAILY
Qty: 30 CAPSULE | Refills: 0 | Status: SHIPPED | COMMUNITY
Start: 2017-10-05 | End: 2017-10-19

## 2017-10-05 NOTE — TELEPHONE ENCOUNTER
Left VM on patients preferred number (cell) with results and triage line to call back with any questions.

## 2017-10-07 ENCOUNTER — HOSPITAL ENCOUNTER (OUTPATIENT)
Dept: GENERAL RADIOLOGY | Age: 62
Discharge: HOME | End: 2017-10-07
Attending: INTERNAL MEDICINE
Payer: COMMERCIAL

## 2017-10-07 DIAGNOSIS — M79.672 LEFT FOOT PAIN: ICD-10-CM

## 2017-10-07 PROCEDURE — 73630 X-RAY EXAM OF FOOT: CPT | Performed by: INTERNAL MEDICINE

## 2017-10-09 NOTE — PROGRESS NOTES
CC: Patient presents with:  Weight Check: up 1 lb-Total wt loss of 44 Ibs       HPI:   Obesity, doing well on phentermine and metformin. Working out well but limited to some foot issues.  Having worsening anxiety and depression issues       Current Outp Popliteal fullness        REVIEW OF SYSTEMS:   CARDIOVASCULAR: denies chest pain    EXAM:   /76   Pulse 78   Resp 16   Ht 68\"   Wt 189 lb   BMI 28.74 kg/m²   GENERAL:  A/O x3  HEENT:  NCAT, PERRLA  LUNGS: CTA bilat   CARDIO: RRR without murmur    No

## 2017-10-27 ENCOUNTER — TELEPHONE (OUTPATIENT)
Dept: INTERNAL MEDICINE CLINIC | Facility: CLINIC | Age: 62
End: 2017-10-27

## 2017-10-27 ENCOUNTER — OFFICE VISIT (OUTPATIENT)
Dept: INTERNAL MEDICINE CLINIC | Facility: CLINIC | Age: 62
End: 2017-10-27

## 2017-10-27 VITALS
DIASTOLIC BLOOD PRESSURE: 80 MMHG | WEIGHT: 191 LBS | BODY MASS INDEX: 29.98 KG/M2 | HEART RATE: 84 BPM | RESPIRATION RATE: 16 BRPM | HEIGHT: 67 IN | SYSTOLIC BLOOD PRESSURE: 120 MMHG

## 2017-10-27 DIAGNOSIS — Z12.31 ENCOUNTER FOR SCREENING MAMMOGRAM FOR MALIGNANT NEOPLASM OF BREAST: Primary | ICD-10-CM

## 2017-10-27 DIAGNOSIS — Z51.81 ENCOUNTER FOR THERAPEUTIC DRUG MONITORING: Primary | ICD-10-CM

## 2017-10-27 DIAGNOSIS — E66.9 OBESITY (BMI 30-39.9): ICD-10-CM

## 2017-10-27 PROCEDURE — 99213 OFFICE O/P EST LOW 20 MIN: CPT | Performed by: INTERNAL MEDICINE

## 2017-10-27 RX ORDER — PHENTERMINE HYDROCHLORIDE 15 MG/1
15 CAPSULE ORAL EVERY MORNING
Qty: 30 CAPSULE | Refills: 0 | Status: SHIPPED | OUTPATIENT
Start: 2017-10-27 | End: 2018-05-21

## 2017-10-27 RX ORDER — BUPROPION HYDROCHLORIDE 150 MG/1
150 TABLET ORAL DAILY
Qty: 30 TABLET | Refills: 5 | Status: SHIPPED | OUTPATIENT
Start: 2017-10-27 | End: 2018-05-18

## 2017-10-27 RX ORDER — PHENDIMETRAZINE TARTRATE 105 MG/1
1 CAPSULE, EXTENDED RELEASE ORAL DAILY
Qty: 30 CAPSULE | Refills: 0 | Status: CANCELLED | OUTPATIENT
Start: 2017-10-27 | End: 2017-11-10

## 2017-10-27 NOTE — PROGRESS NOTES
CC: Patient presents with:  Weight Check: up 2        HPI:   Obesity, did not feel any appetite suppression on phendiemetrazine   Feels her portion sizes are off  Still going to the gym every morning         Current Outpatient Prescriptions:  BuPROPion creatinine     Popliteal fullness        REVIEW OF SYSTEMS:   CARDIOVASCULAR: denies chest pain    EXAM:   /80   Pulse 84   Resp 16   Ht 67\"   Wt 191 lb   BMI 29.91 kg/m²   GENERAL:  A/O x3  HEENT:  NCAT, PERRLA  LUNGS: CTA bilat   CARDIO: RRR witho

## 2017-10-30 RX ORDER — LISINOPRIL 20 MG/1
TABLET ORAL
Qty: 90 TABLET | Refills: 0 | Status: SHIPPED | OUTPATIENT
Start: 2017-10-30 | End: 2018-01-25

## 2017-11-25 ENCOUNTER — HOSPITAL ENCOUNTER (OUTPATIENT)
Dept: MAMMOGRAPHY | Age: 62
Discharge: HOME | End: 2017-11-25
Attending: INTERNAL MEDICINE
Payer: COMMERCIAL

## 2017-11-25 DIAGNOSIS — Z12.31 ENCOUNTER FOR SCREENING MAMMOGRAM FOR MALIGNANT NEOPLASM OF BREAST: ICD-10-CM

## 2017-11-25 PROCEDURE — 77067 SCR MAMMO BI INCL CAD: CPT | Performed by: INTERNAL MEDICINE

## 2017-12-20 RX ORDER — ESCITALOPRAM OXALATE 5 MG/1
TABLET ORAL
Qty: 90 TABLET | Refills: 0 | Status: SHIPPED | OUTPATIENT
Start: 2017-12-20 | End: 2018-03-19

## 2017-12-20 NOTE — TELEPHONE ENCOUNTER
LOV: 10/3/17  Future office visit: no upcoming visit   Last labs: 9/30/17 Cbc Cmp Vit B12   Last RX: 10/3/17 #90 no refills  Per protocol: route to provider

## 2018-01-22 RX ORDER — HYDROCHLOROTHIAZIDE 12.5 MG/1
TABLET ORAL
Qty: 90 TABLET | Refills: 0 | Status: SHIPPED | OUTPATIENT
Start: 2018-01-22 | End: 2018-01-25

## 2018-01-22 NOTE — TELEPHONE ENCOUNTER
Last seen 10/3/17, was to return in one month  No upcoming apt on file   To provider and [address] [place] [address] - please help pt schedule

## 2018-01-25 RX ORDER — LISINOPRIL 20 MG/1
20 TABLET ORAL
Qty: 90 TABLET | Refills: 0 | Status: CANCELLED
Start: 2018-01-25

## 2018-01-25 RX ORDER — HYDROCHLOROTHIAZIDE 12.5 MG/1
12.5 TABLET ORAL
Qty: 90 TABLET | Refills: 0 | Status: CANCELLED
Start: 2018-01-25

## 2018-01-25 NOTE — TELEPHONE ENCOUNTER
LOV: 10/3/17 JL  Future office visit: No upcoming visit  Last labs: 9/30/17 Cbc Cmp Vit B12 2/4/17 Lipid   Last RX: Bupropion 1027/17 #30 #5 Refill     Tolterodine 8/9/17 #90 #1 Refill    Omeprazole 8/9/17 #90 #2 Refill  Per protocol: Route to provider

## 2018-01-25 NOTE — TELEPHONE ENCOUNTER
From: Margot Smith  Sent: 1/25/2018 2:39 PM CST  Subject: Medication Renewal Request    Jacquelyn Hanks would like a refill of the following medications:     BuPROPion HCl ER, XL, 300 MG Oral Tablet 24 Hr SAGAR Mccabe]   Patient Comment: I have a n

## 2018-01-26 RX ORDER — OMEPRAZOLE 10 MG/1
10 CAPSULE, DELAYED RELEASE ORAL
Qty: 90 CAPSULE | Refills: 2 | Status: SHIPPED | OUTPATIENT
Start: 2018-01-26 | End: 2018-05-18

## 2018-01-26 RX ORDER — TOLTERODINE 4 MG/1
4 CAPSULE, EXTENDED RELEASE ORAL
Qty: 90 CAPSULE | Refills: 1 | Status: SHIPPED | OUTPATIENT
Start: 2018-01-26 | End: 2018-05-18

## 2018-01-26 RX ORDER — BUPROPION HYDROCHLORIDE 300 MG/1
300 TABLET ORAL DAILY
Qty: 90 TABLET | Refills: 1 | Status: SHIPPED | OUTPATIENT
Start: 2018-01-26 | End: 2018-05-18

## 2018-01-26 RX ORDER — HYDROCHLOROTHIAZIDE 12.5 MG/1
12.5 TABLET ORAL
Qty: 90 TABLET | Refills: 0 | Status: SHIPPED | OUTPATIENT
Start: 2018-01-26 | End: 2018-04-15

## 2018-01-26 RX ORDER — LISINOPRIL 20 MG/1
20 TABLET ORAL
Qty: 90 TABLET | Refills: 0 | Status: SHIPPED | OUTPATIENT
Start: 2018-01-26 | End: 2018-04-18

## 2018-01-29 RX ORDER — LISINOPRIL 20 MG/1
TABLET ORAL
Qty: 90 TABLET | Refills: 0 | OUTPATIENT
Start: 2018-01-29

## 2018-02-05 RX ORDER — TOLTERODINE 4 MG/1
CAPSULE, EXTENDED RELEASE ORAL
Qty: 90 CAPSULE | Refills: 0 | Status: SHIPPED | OUTPATIENT
Start: 2018-02-05 | End: 2018-05-18

## 2018-03-08 ENCOUNTER — TELEPHONE (OUTPATIENT)
Dept: INTERNAL MEDICINE CLINIC | Facility: CLINIC | Age: 63
End: 2018-03-08

## 2018-03-08 DIAGNOSIS — Z13.0 SCREENING FOR BLOOD DISEASE: ICD-10-CM

## 2018-03-08 DIAGNOSIS — Z13.29 SCREENING FOR THYROID DISORDER: ICD-10-CM

## 2018-03-08 DIAGNOSIS — Z12.39 SCREENING FOR MALIGNANT NEOPLASM OF BREAST: ICD-10-CM

## 2018-03-08 DIAGNOSIS — Z13.220 SCREENING FOR LIPID DISORDERS: ICD-10-CM

## 2018-03-08 DIAGNOSIS — Z13.228 SCREENING FOR METABOLIC DISORDER: ICD-10-CM

## 2018-03-08 DIAGNOSIS — Z00.00 ROUTINE GENERAL MEDICAL EXAMINATION AT A HEALTH CARE FACILITY: Primary | ICD-10-CM

## 2018-03-08 NOTE — TELEPHONE ENCOUNTER
Patient is having a physical.  Please place orders with [address]. Patient will be fasting.   Future Appointments  Date Time Provider Deepti Harvey   3/21/2018 7:30 AM [facility]/[place] VASCULAR SURGERY [facility]   5/18/2018 7:15 AM Vonnie Whitfield MD [facility] 7

## 2018-03-20 RX ORDER — ESCITALOPRAM OXALATE 5 MG/1
5 TABLET ORAL DAILY
Qty: 90 TABLET | Refills: 0 | Status: SHIPPED | OUTPATIENT
Start: 2018-03-20 | End: 2018-03-23

## 2018-03-20 NOTE — TELEPHONE ENCOUNTER
Last Office Visit: 10-3-17 with VERONICA for medication follow up   Last Rx Filled: 12-20-17 90 tabs with no refill  Last Labs: 9-30-17 cbc/cmp  Future Appointment: 5-18-18    Per protocol to provider

## 2018-03-23 ENCOUNTER — PATIENT MESSAGE (OUTPATIENT)
Dept: INTERNAL MEDICINE CLINIC | Facility: CLINIC | Age: 63
End: 2018-03-23

## 2018-03-23 RX ORDER — ESCITALOPRAM OXALATE 5 MG/1
5 TABLET ORAL DAILY
Qty: 90 TABLET | Refills: 0 | Status: SHIPPED | OUTPATIENT
Start: 2018-03-23 | End: 2018-05-18

## 2018-03-23 NOTE — TELEPHONE ENCOUNTER
From: Rafael Torres  To: SAGAR Jesus  Sent: 3/23/2018 2:42 PM CDT  Subject: Prescription Question      Joe Durbin sent my refills to the incorrect provider. I now have [facility] as my provider for perscription. Would you please resend right away.  I am

## 2018-04-04 ENCOUNTER — TELEPHONE (OUTPATIENT)
Dept: CARDIAC SURGERY | Facility: HOSPITAL | Age: 63
End: 2018-04-04

## 2018-04-05 NOTE — TELEPHONE ENCOUNTER
I called the patient to discuss with her the findings on her ultrasound. I had sent her a letter detailing the findings and our plan for a one-year follow-up. She apparently did not receive that at the time that she called for results.   I discussed the r

## 2018-04-16 RX ORDER — HYDROCHLOROTHIAZIDE 12.5 MG/1
12.5 TABLET ORAL DAILY
Qty: 90 TABLET | Refills: 0 | Status: SHIPPED | OUTPATIENT
Start: 2018-04-16 | End: 2018-05-18

## 2018-04-18 ENCOUNTER — PATIENT MESSAGE (OUTPATIENT)
Dept: INTERNAL MEDICINE CLINIC | Facility: CLINIC | Age: 63
End: 2018-04-18

## 2018-04-18 RX ORDER — LISINOPRIL 20 MG/1
20 TABLET ORAL
Qty: 90 TABLET | Refills: 0 | Status: SHIPPED | OUTPATIENT
Start: 2018-04-18 | End: 2018-05-18

## 2018-04-18 NOTE — TELEPHONE ENCOUNTER
From: Kareen Ling  To: Xavier León MD  Sent: 4/18/2018 2:39 PM CDT  Subject: Referral Request    Dr. Herrera Expose,  I have an appointment with you for my annual exam on May 18th.  At the time I set up the appointment, I was told that someone would se

## 2018-04-18 NOTE — TELEPHONE ENCOUNTER
From: Melyssa Dumont  Sent: 4/18/2018 2:32 PM CDT  Subject: Medication Renewal Request    Jacquelyn Craig would like a refill of the following medications:     lisinopril 20 MG Oral Tab Umm Rivera MD]   Patient Comment: [facility]

## 2018-05-05 ENCOUNTER — LAB ENCOUNTER (OUTPATIENT)
Dept: LAB | Age: 63
End: 2018-05-05
Attending: INTERNAL MEDICINE
Payer: COMMERCIAL

## 2018-05-05 DIAGNOSIS — Z13.228 SCREENING FOR METABOLIC DISORDER: ICD-10-CM

## 2018-05-05 DIAGNOSIS — Z00.00 ROUTINE GENERAL MEDICAL EXAMINATION AT A HEALTH CARE FACILITY: ICD-10-CM

## 2018-05-05 DIAGNOSIS — Z13.0 SCREENING FOR BLOOD DISEASE: ICD-10-CM

## 2018-05-05 DIAGNOSIS — Z13.220 SCREENING FOR LIPID DISORDERS: ICD-10-CM

## 2018-05-05 DIAGNOSIS — Z13.29 SCREENING FOR THYROID DISORDER: ICD-10-CM

## 2018-05-05 PROCEDURE — 36415 COLL VENOUS BLD VENIPUNCTURE: CPT | Performed by: INTERNAL MEDICINE

## 2018-05-05 PROCEDURE — 80061 LIPID PANEL: CPT | Performed by: INTERNAL MEDICINE

## 2018-05-05 PROCEDURE — 80050 GENERAL HEALTH PANEL: CPT | Performed by: INTERNAL MEDICINE

## 2018-05-18 ENCOUNTER — OFFICE VISIT (OUTPATIENT)
Dept: INTERNAL MEDICINE CLINIC | Facility: CLINIC | Age: 63
End: 2018-05-18

## 2018-05-18 VITALS
HEART RATE: 80 BPM | BODY MASS INDEX: 32.58 KG/M2 | SYSTOLIC BLOOD PRESSURE: 118 MMHG | DIASTOLIC BLOOD PRESSURE: 82 MMHG | TEMPERATURE: 98 F | RESPIRATION RATE: 17 BRPM | HEIGHT: 68 IN | WEIGHT: 215 LBS

## 2018-05-18 DIAGNOSIS — M25.562 CHRONIC ARTHRALGIAS OF KNEES AND HIPS: ICD-10-CM

## 2018-05-18 DIAGNOSIS — N32.81 OAB (OVERACTIVE BLADDER): ICD-10-CM

## 2018-05-18 DIAGNOSIS — G89.29 CHRONIC ARTHRALGIAS OF KNEES AND HIPS: ICD-10-CM

## 2018-05-18 DIAGNOSIS — M25.561 CHRONIC ARTHRALGIAS OF KNEES AND HIPS: ICD-10-CM

## 2018-05-18 DIAGNOSIS — Z00.00 ROUTINE GENERAL MEDICAL EXAMINATION AT A HEALTH CARE FACILITY: Primary | ICD-10-CM

## 2018-05-18 DIAGNOSIS — Z51.81 ENCOUNTER FOR THERAPEUTIC DRUG MONITORING: ICD-10-CM

## 2018-05-18 DIAGNOSIS — Z12.31 ENCOUNTER FOR SCREENING MAMMOGRAM FOR BREAST CANCER: ICD-10-CM

## 2018-05-18 DIAGNOSIS — E66.9 OBESITY (BMI 30-39.9): ICD-10-CM

## 2018-05-18 DIAGNOSIS — M25.551 CHRONIC ARTHRALGIAS OF KNEES AND HIPS: ICD-10-CM

## 2018-05-18 DIAGNOSIS — I10 ESSENTIAL HYPERTENSION: ICD-10-CM

## 2018-05-18 DIAGNOSIS — K21.9 GASTROESOPHAGEAL REFLUX DISEASE, ESOPHAGITIS PRESENCE NOT SPECIFIED: ICD-10-CM

## 2018-05-18 DIAGNOSIS — M25.552 CHRONIC ARTHRALGIAS OF KNEES AND HIPS: ICD-10-CM

## 2018-05-18 PROCEDURE — 99212 OFFICE O/P EST SF 10 MIN: CPT | Performed by: INTERNAL MEDICINE

## 2018-05-18 PROCEDURE — 99396 PREV VISIT EST AGE 40-64: CPT | Performed by: INTERNAL MEDICINE

## 2018-05-18 RX ORDER — OMEPRAZOLE 10 MG/1
10 CAPSULE, DELAYED RELEASE ORAL EVERY OTHER DAY
Qty: 45 CAPSULE | Refills: 3 | Status: SHIPPED | OUTPATIENT
Start: 2018-05-18 | End: 2018-09-14 | Stop reason: ALTCHOICE

## 2018-05-18 RX ORDER — LISINOPRIL 20 MG/1
20 TABLET ORAL
Qty: 90 TABLET | Refills: 3 | Status: SHIPPED | OUTPATIENT
Start: 2018-05-18 | End: 2019-08-23 | Stop reason: ALTCHOICE

## 2018-05-18 RX ORDER — HYDROCHLOROTHIAZIDE 12.5 MG/1
12.5 TABLET ORAL DAILY
Qty: 90 TABLET | Refills: 3 | Status: SHIPPED | OUTPATIENT
Start: 2018-05-18 | End: 2019-08-23 | Stop reason: ALTCHOICE

## 2018-05-18 RX ORDER — BUPROPION HYDROCHLORIDE 300 MG/1
300 TABLET ORAL DAILY
Qty: 90 TABLET | Refills: 3 | Status: SHIPPED | OUTPATIENT
Start: 2018-05-18 | End: 2019-07-14

## 2018-05-18 RX ORDER — TOLTERODINE 4 MG/1
4 CAPSULE, EXTENDED RELEASE ORAL
Qty: 90 CAPSULE | Refills: 3 | Status: SHIPPED | OUTPATIENT
Start: 2018-05-18 | End: 2019-08-23 | Stop reason: ALTCHOICE

## 2018-05-18 RX ORDER — ESCITALOPRAM OXALATE 5 MG/1
5 TABLET ORAL DAILY
Qty: 90 TABLET | Refills: 3 | Status: CANCELLED | OUTPATIENT
Start: 2018-05-18

## 2018-05-18 RX ORDER — BUPROPION HYDROCHLORIDE 150 MG/1
150 TABLET ORAL DAILY
Qty: 90 TABLET | Refills: 3 | Status: SHIPPED | OUTPATIENT
Start: 2018-05-18 | End: 2019-02-04

## 2018-05-19 NOTE — PROGRESS NOTES
HPI:    Patient ID: Margot Smith is a 60year old female.     HPI  Here for pe, gerd controlled on meds, depression well controlled, htn, oab, obesity on meds, overdue for fu with wt loss clinic  /82 (BP Location: Left arm, Patient Position: Sittin Physical Exam   Constitutional: She is oriented to person, place, and time. She appears well-developed and well-nourished. HENT:   Head: Normocephalic and atraumatic.    Right Ear: External ear normal.   Left Ear: External ear normal.   Mouth/Throat: No Sig: Take 1 tablet (300 mg total) by mouth daily. hydrochlorothiazide 12.5 MG Oral Tab 90 tablet 3      Sig: Take 1 tablet (12.5 mg total) by mouth daily.       lisinopril 20 MG Oral Tab 90 tablet 3      Sig: Take 1 tablet (20 mg total) by mouth once

## 2018-05-21 ENCOUNTER — OFFICE VISIT (OUTPATIENT)
Dept: INTERNAL MEDICINE CLINIC | Facility: CLINIC | Age: 63
End: 2018-05-21

## 2018-05-21 VITALS
BODY MASS INDEX: 33.43 KG/M2 | DIASTOLIC BLOOD PRESSURE: 78 MMHG | HEIGHT: 67 IN | RESPIRATION RATE: 16 BRPM | SYSTOLIC BLOOD PRESSURE: 122 MMHG | WEIGHT: 213 LBS | HEART RATE: 78 BPM

## 2018-05-21 DIAGNOSIS — Z51.81 ENCOUNTER FOR THERAPEUTIC DRUG MONITORING: Primary | ICD-10-CM

## 2018-05-21 DIAGNOSIS — R79.89 ELEVATED SERUM CREATININE: ICD-10-CM

## 2018-05-21 DIAGNOSIS — E66.9 OBESITY (BMI 30-39.9): ICD-10-CM

## 2018-05-21 DIAGNOSIS — R63.5 WEIGHT GAIN: ICD-10-CM

## 2018-05-21 PROCEDURE — 99214 OFFICE O/P EST MOD 30 MIN: CPT | Performed by: INTERNAL MEDICINE

## 2018-05-21 RX ORDER — PHENTERMINE HYDROCHLORIDE 37.5 MG/1
37.5 TABLET ORAL
Qty: 30 TABLET | Refills: 0 | Status: SHIPPED | OUTPATIENT
Start: 2018-05-21 | End: 2018-06-22

## 2018-05-21 NOTE — PROGRESS NOTES
HISTORY OF PRESENT ILLNESS  Patient presents with:  Weight Check: up 22 lb      Adela Mishra is a 60year old female here for follow up in medical weight loss program.     Denies chest pain, shortness of breath, dizziness, blurred vision, headache, pa 05/05/2018   BILT 0.6 05/05/2018   TP 7.4 05/05/2018   ALB 3.8 05/05/2018    05/05/2018   K 4.5 05/05/2018    05/05/2018   CO2 31.0 05/05/2018       Lab Results  Component Value Date    12/03/2016   A1C 5.6 12/03/2016       Lab Results lbs / rosanne 188 lbs  · Gained 22 lbs since follow up in Nov. We discussed the more natural approach is to decline medication slowly to prevent rebound weight gain.  We also reviewed other medication options and the need for dietitian evaluation for more suc

## 2018-06-11 RX ORDER — ESCITALOPRAM OXALATE 5 MG/1
TABLET ORAL
Qty: 90 TABLET | Refills: 0 | Status: SHIPPED | OUTPATIENT
Start: 2018-06-11 | End: 2018-06-18

## 2018-06-11 NOTE — TELEPHONE ENCOUNTER
LOV: 05/18/2018  Future Visit: No appointment scheduled   Last Labs: 05/05/2018 TSH, Lipid panel, COMP, CBC  Last Rx: Discontinued 03/23/2018 Physician directed VERONICA     Per protocol

## 2018-06-18 ENCOUNTER — PATIENT MESSAGE (OUTPATIENT)
Dept: INTERNAL MEDICINE CLINIC | Facility: CLINIC | Age: 63
End: 2018-06-18

## 2018-06-18 DIAGNOSIS — M25.561 CHRONIC ARTHRALGIAS OF KNEES AND HIPS: Primary | ICD-10-CM

## 2018-06-18 DIAGNOSIS — M25.562 CHRONIC ARTHRALGIAS OF KNEES AND HIPS: Primary | ICD-10-CM

## 2018-06-18 DIAGNOSIS — M25.551 CHRONIC ARTHRALGIAS OF KNEES AND HIPS: Primary | ICD-10-CM

## 2018-06-18 DIAGNOSIS — M25.552 CHRONIC ARTHRALGIAS OF KNEES AND HIPS: Primary | ICD-10-CM

## 2018-06-18 DIAGNOSIS — G89.29 CHRONIC ARTHRALGIAS OF KNEES AND HIPS: Primary | ICD-10-CM

## 2018-06-18 NOTE — TELEPHONE ENCOUNTER
From: Preston Velazquez  To: Enma Graham MD  Sent: 6/18/2018 9:49 AM CDT  Subject: Prescription Question     Doctor,  Recently you approved a refill for ESCITALOPRAM 5 MG Oral Tab. I no longer take this medicine per the notes of my recent visit.  This r

## 2018-06-18 NOTE — TELEPHONE ENCOUNTER
Spoke with Dr. Angus Casanova, patient will need CBC, CMP, Sed rate, CRP, Rheumatoid factor and MOON. Last CBC and CMP done on 5/5/2018 do not need to be repeated. VERONICA ordered Rheumatoid arthritis factor, MOON, and Sed rate on 5/18/2018.    CRP lab order

## 2018-06-22 ENCOUNTER — OFFICE VISIT (OUTPATIENT)
Dept: INTERNAL MEDICINE CLINIC | Facility: CLINIC | Age: 63
End: 2018-06-22

## 2018-06-22 VITALS
DIASTOLIC BLOOD PRESSURE: 70 MMHG | WEIGHT: 204 LBS | RESPIRATION RATE: 16 BRPM | HEIGHT: 67 IN | SYSTOLIC BLOOD PRESSURE: 130 MMHG | HEART RATE: 68 BPM | BODY MASS INDEX: 32.02 KG/M2

## 2018-06-22 DIAGNOSIS — Z51.81 ENCOUNTER FOR THERAPEUTIC DRUG MONITORING: Primary | ICD-10-CM

## 2018-06-22 DIAGNOSIS — E66.9 OBESITY (BMI 30-39.9): ICD-10-CM

## 2018-06-22 PROCEDURE — 99213 OFFICE O/P EST LOW 20 MIN: CPT | Performed by: INTERNAL MEDICINE

## 2018-06-22 RX ORDER — PHENTERMINE HYDROCHLORIDE 37.5 MG/1
37.5 TABLET ORAL
Qty: 30 TABLET | Refills: 0 | Status: SHIPPED | OUTPATIENT
Start: 2018-06-22 | End: 2018-07-16

## 2018-06-22 NOTE — PROGRESS NOTES
HISTORY OF PRESENT ILLNESS  Patient presents with:  Weight Check: down 9      Jacquelyn Aguiar Bridgette De Los Santos is a 60year old female here for follow up in medical weight loss program.     Denies chest pain, shortness of breath, dizziness, blurred vision, headache, pare Results  Component Value Date    12/03/2016   A1C 5.6 12/03/2016       Lab Results  Component Value Date   CHOLEST 199 05/05/2018   TRIG 62 05/05/2018   HDL 74 05/05/2018    05/05/2018   VLDL 12 05/05/2018   TCHDLRATIO 2.69 05/05/2018   NONHD psychologist as recommended. · Discussed the role of sleep and stress in weight management. · Counseled on comprehensive weight loss plan including attention to nutrition, exercise and behavior/stress management for success.  See patient instruction below

## 2018-06-30 ENCOUNTER — APPOINTMENT (OUTPATIENT)
Dept: LAB | Age: 63
End: 2018-06-30
Attending: NURSE PRACTITIONER
Payer: COMMERCIAL

## 2018-06-30 DIAGNOSIS — M25.562 CHRONIC ARTHRALGIAS OF KNEES AND HIPS: ICD-10-CM

## 2018-06-30 DIAGNOSIS — G89.29 CHRONIC ARTHRALGIAS OF KNEES AND HIPS: ICD-10-CM

## 2018-06-30 DIAGNOSIS — M25.552 CHRONIC ARTHRALGIAS OF KNEES AND HIPS: ICD-10-CM

## 2018-06-30 DIAGNOSIS — M25.551 CHRONIC ARTHRALGIAS OF KNEES AND HIPS: ICD-10-CM

## 2018-06-30 DIAGNOSIS — M25.561 CHRONIC ARTHRALGIAS OF KNEES AND HIPS: ICD-10-CM

## 2018-06-30 LAB
C-REACTIVE PROTEIN: <0.29 MG/DL (ref ?–1)
RHEUMATOID FACT SERPL-ACNC: <10 IU/ML (ref ?–15)
SED RATE-ML: 6 MM/HR (ref 0–25)

## 2018-06-30 PROCEDURE — 86140 C-REACTIVE PROTEIN: CPT | Performed by: NURSE PRACTITIONER

## 2018-06-30 PROCEDURE — 85652 RBC SED RATE AUTOMATED: CPT | Performed by: INTERNAL MEDICINE

## 2018-06-30 PROCEDURE — 86431 RHEUMATOID FACTOR QUANT: CPT | Performed by: INTERNAL MEDICINE

## 2018-06-30 PROCEDURE — 36415 COLL VENOUS BLD VENIPUNCTURE: CPT | Performed by: INTERNAL MEDICINE

## 2018-06-30 PROCEDURE — 86038 ANTINUCLEAR ANTIBODIES: CPT | Performed by: INTERNAL MEDICINE

## 2018-07-02 LAB — ANA SCREEN: NEGATIVE

## 2018-07-16 ENCOUNTER — OFFICE VISIT (OUTPATIENT)
Dept: INTERNAL MEDICINE CLINIC | Facility: CLINIC | Age: 63
End: 2018-07-16

## 2018-07-16 VITALS
HEART RATE: 78 BPM | SYSTOLIC BLOOD PRESSURE: 122 MMHG | DIASTOLIC BLOOD PRESSURE: 80 MMHG | RESPIRATION RATE: 16 BRPM | BODY MASS INDEX: 30.61 KG/M2 | HEIGHT: 67 IN | WEIGHT: 195 LBS

## 2018-07-16 DIAGNOSIS — E66.9 OBESITY (BMI 30-39.9): ICD-10-CM

## 2018-07-16 DIAGNOSIS — Z51.81 THERAPEUTIC DRUG MONITORING: Primary | ICD-10-CM

## 2018-07-16 PROCEDURE — 99213 OFFICE O/P EST LOW 20 MIN: CPT | Performed by: INTERNAL MEDICINE

## 2018-07-16 RX ORDER — PHENTERMINE HYDROCHLORIDE 37.5 MG/1
37.5 TABLET ORAL
Qty: 30 TABLET | Refills: 0 | Status: SHIPPED | OUTPATIENT
Start: 2018-07-16 | End: 2018-08-14

## 2018-07-16 NOTE — PROGRESS NOTES
HISTORY OF PRESENT ILLNESS  Patient presents with:  Weight Check: down 9 lb      Liya Duarte is a 60year old female here for follow up in medical weight loss program.     Denies chest pain, shortness of breath, dizziness, blurred vision, headache, p 12/03/2016   A1C 5.6 12/03/2016       Lab Results  Component Value Date   CHOLEST 199 05/05/2018   TRIG 62 05/05/2018   HDL 74 05/05/2018    05/05/2018   VLDL 12 05/05/2018   TCHDLRATIO 2.69 05/05/2018   [place] 125 05/05/2018       Lab Results  Com effects reviewed with patient. · Follow up with dietitian and psychologist as recommended. · Discussed the role of sleep and stress in weight management.   · Counseled on comprehensive weight loss plan including attention to nutrition, exercise and beha

## 2018-07-18 ENCOUNTER — PATIENT MESSAGE (OUTPATIENT)
Dept: INTERNAL MEDICINE CLINIC | Facility: CLINIC | Age: 63
End: 2018-07-18

## 2018-07-18 NOTE — TELEPHONE ENCOUNTER
From: Zunilda Gabriel  To: Saúl Alonso MD  Sent: 7/18/2018 5:52 AM CDT  Subject: Non-Urgent Medical Question    I think I may have a bladder infection. Can I come in today around 11:00 AM and take a urine test? Thanks.

## 2018-07-19 ENCOUNTER — APPOINTMENT (OUTPATIENT)
Dept: CT IMAGING | Facility: HOSPITAL | Age: 63
End: 2018-07-19
Attending: EMERGENCY MEDICINE
Payer: COMMERCIAL

## 2018-07-19 ENCOUNTER — HOSPITAL ENCOUNTER (EMERGENCY)
Facility: HOSPITAL | Age: 63
Discharge: STILL ADMITTED OR ADMINISTRATIVE | End: 2018-07-20
Payer: COMMERCIAL

## 2018-07-19 ENCOUNTER — HOSPITAL ENCOUNTER (EMERGENCY)
Facility: HOSPITAL | Age: 63
Discharge: HOME | End: 2018-07-19
Attending: EMERGENCY MEDICINE
Payer: COMMERCIAL

## 2018-07-19 ENCOUNTER — HOSPITAL ENCOUNTER (OUTPATIENT)
Age: 63
Discharge: ANOTHER HOSPITAL | End: 2018-07-19
Attending: FAMILY MEDICINE
Payer: COMMERCIAL

## 2018-07-19 VITALS
HEART RATE: 88 BPM | WEIGHT: 194 LBS | OXYGEN SATURATION: 96 % | RESPIRATION RATE: 18 BRPM | DIASTOLIC BLOOD PRESSURE: 97 MMHG | BODY MASS INDEX: 29.4 KG/M2 | SYSTOLIC BLOOD PRESSURE: 140 MMHG | HEIGHT: 68 IN | TEMPERATURE: 100 F

## 2018-07-19 VITALS
BODY MASS INDEX: 29.55 KG/M2 | HEART RATE: 75 BPM | OXYGEN SATURATION: 98 % | DIASTOLIC BLOOD PRESSURE: 78 MMHG | SYSTOLIC BLOOD PRESSURE: 131 MMHG | HEIGHT: 68 IN | WEIGHT: 195 LBS | RESPIRATION RATE: 16 BRPM | TEMPERATURE: 98 F

## 2018-07-19 DIAGNOSIS — R10.31 ACUTE BILATERAL LOWER ABDOMINAL PAIN: Primary | ICD-10-CM

## 2018-07-19 DIAGNOSIS — R10.32 ACUTE BILATERAL LOWER ABDOMINAL PAIN: Primary | ICD-10-CM

## 2018-07-19 DIAGNOSIS — R10.9 ABDOMINAL PAIN OF UNKNOWN ETIOLOGY: Primary | ICD-10-CM

## 2018-07-19 DIAGNOSIS — K59.00 CONSTIPATION, UNSPECIFIED CONSTIPATION TYPE: ICD-10-CM

## 2018-07-19 LAB
ALBUMIN SERPL-MCNC: 3.8 G/DL (ref 3.5–4.8)
ALBUMIN/GLOB SERPL: 1 {RATIO} (ref 1–2)
ALP LIVER SERPL-CCNC: 100 U/L (ref 50–130)
ALT SERPL-CCNC: 35 U/L (ref 14–54)
ANION GAP SERPL CALC-SCNC: 6 MMOL/L (ref 0–18)
AST SERPL-CCNC: 32 U/L (ref 15–41)
BASOPHILS # BLD AUTO: 0.05 X10(3) UL (ref 0–0.1)
BASOPHILS NFR BLD AUTO: 1 %
BILIRUB SERPL-MCNC: 1.2 MG/DL (ref 0.1–2)
BUN BLD-MCNC: 20 MG/DL (ref 8–20)
BUN/CREAT SERPL: 15.5 (ref 10–20)
CALCIUM BLD-MCNC: 9.4 MG/DL (ref 8.3–10.3)
CHLORIDE SERPL-SCNC: 106 MMOL/L (ref 101–111)
CO2 SERPL-SCNC: 29 MMOL/L (ref 22–32)
CREAT BLD-MCNC: 1.29 MG/DL (ref 0.55–1.02)
EOSINOPHIL # BLD AUTO: 0.19 X10(3) UL (ref 0–0.3)
EOSINOPHIL NFR BLD AUTO: 3.7 %
ERYTHROCYTE [DISTWIDTH] IN BLOOD BY AUTOMATED COUNT: 13.7 % (ref 11.5–16)
GLOBULIN PLAS-MCNC: 3.8 G/DL (ref 2.5–3.7)
GLUCOSE BLD-MCNC: 93 MG/DL (ref 70–99)
HCT VFR BLD AUTO: 45.4 % (ref 34–50)
HGB BLD-MCNC: 14.3 G/DL (ref 12–16)
IMMATURE GRANULOCYTE COUNT: 0.02 X10(3) UL (ref 0–1)
IMMATURE GRANULOCYTE RATIO %: 0.4 %
LYMPHOCYTES # BLD AUTO: 1.3 X10(3) UL (ref 0.9–4)
LYMPHOCYTES NFR BLD AUTO: 25.1 %
M PROTEIN MFR SERPL ELPH: 7.6 G/DL (ref 6.1–8.3)
MCH RBC QN AUTO: 27.7 PG (ref 27–33.2)
MCHC RBC AUTO-ENTMCNC: 31.5 G/DL (ref 31–37)
MCV RBC AUTO: 88 FL (ref 81–100)
MONOCYTES # BLD AUTO: 0.46 X10(3) UL (ref 0.1–1)
MONOCYTES NFR BLD AUTO: 8.9 %
NEUTROPHIL ABS PRELIM: 3.15 X10 (3) UL (ref 1.3–6.7)
NEUTROPHILS # BLD AUTO: 3.15 X10(3) UL (ref 1.3–6.7)
NEUTROPHILS NFR BLD AUTO: 60.9 %
OSMOLALITY SERPL CALC.SUM OF ELEC: 294 MOSM/KG (ref 275–295)
PLATELET # BLD AUTO: 226 10(3)UL (ref 150–450)
POCT BILIRUBIN URINE: NEGATIVE
POCT BLOOD URINE: NEGATIVE
POCT GLUCOSE URINE: NEGATIVE MG/DL
POCT KETONE URINE: NEGATIVE MG/DL
POCT LEUKOCYTE ESTERASE URINE: NEGATIVE
POCT NITRITE URINE: NEGATIVE
POCT PH URINE: 7 (ref 5–8)
POCT PROTEIN URINE: NEGATIVE MG/DL
POCT SPECIFIC GRAVITY URINE: 1.01
POCT URINE CLARITY: CLEAR
POCT URINE COLOR: YELLOW
POCT UROBILINOGEN URINE: 0.2 MG/DL
POTASSIUM SERPL-SCNC: 3.9 MMOL/L (ref 3.6–5.1)
RBC # BLD AUTO: 5.16 X10(6)UL (ref 3.8–5.1)
RED CELL DISTRIBUTION WIDTH-SD: 44.6 FL (ref 35.1–46.3)
SODIUM SERPL-SCNC: 141 MMOL/L (ref 136–144)
WBC # BLD AUTO: 5.2 X10(3) UL (ref 4–13)

## 2018-07-19 PROCEDURE — 81002 URINALYSIS NONAUTO W/O SCOPE: CPT | Performed by: FAMILY MEDICINE

## 2018-07-19 PROCEDURE — 99285 EMERGENCY DEPT VISIT HI MDM: CPT

## 2018-07-19 PROCEDURE — 74177 CT ABD & PELVIS W/CONTRAST: CPT | Performed by: EMERGENCY MEDICINE

## 2018-07-19 PROCEDURE — 85025 COMPLETE CBC W/AUTO DIFF WBC: CPT | Performed by: EMERGENCY MEDICINE

## 2018-07-19 PROCEDURE — 36415 COLL VENOUS BLD VENIPUNCTURE: CPT

## 2018-07-19 PROCEDURE — 96372 THER/PROPH/DIAG INJ SC/IM: CPT

## 2018-07-19 PROCEDURE — 99213 OFFICE O/P EST LOW 20 MIN: CPT

## 2018-07-19 PROCEDURE — 99202 OFFICE O/P NEW SF 15 MIN: CPT

## 2018-07-19 PROCEDURE — 80053 COMPREHEN METABOLIC PANEL: CPT | Performed by: EMERGENCY MEDICINE

## 2018-07-19 PROCEDURE — 81002 URINALYSIS NONAUTO W/O SCOPE: CPT

## 2018-07-19 RX ORDER — DICYCLOMINE HYDROCHLORIDE 10 MG/ML
20 INJECTION INTRAMUSCULAR ONCE
Status: COMPLETED | OUTPATIENT
Start: 2018-07-19 | End: 2018-07-19

## 2018-07-19 RX ORDER — DICYCLOMINE HYDROCHLORIDE 10 MG/1
10 CAPSULE ORAL
Status: DISCONTINUED | OUTPATIENT
Start: 2018-07-19 | End: 2018-07-19

## 2018-07-19 RX ORDER — DICYCLOMINE HCL 20 MG
20 TABLET ORAL ONCE
Status: COMPLETED | OUTPATIENT
Start: 2018-07-19 | End: 2018-07-19

## 2018-07-19 NOTE — ED INITIAL ASSESSMENT (HPI)
Pt c/o urinary s/s x 4 days including frequency, urgency. Pt reports she has had a bladder infection in the past and this feels like the same. Denies fever, chills, back pain or vomiting.

## 2018-07-19 NOTE — ED INITIAL ASSESSMENT (HPI)
Pt here from [facility] with c/o lower abdominal pain X 4 days, denies N/V/D, last bowel movement Saturday. Patient denies any fever at home.

## 2018-07-19 NOTE — ED PROVIDER NOTES
Patient Seen in: [facility] Emergency Department    History   Patient presents with:  Abdomen/Flank Pain (GI/)    Stated Complaint: ABD PAIN    HPI    50-year-old female presents to the emergency department complaining of having low abdominal pain 1549]    Current:/88   Pulse 84   Temp 98.4 °F (36.9 °C) (Temporal)   Resp 16   Ht 172.7 cm (5' 8\")   Wt 88.5 kg   LMP  (LMP Unknown)   SpO2 98%   BMI 29.65 kg/m²         Physical Exam  General: This a pleasant nontoxic appearing patient in no appar DIFFERENTIAL[[id number]]          Abnormal            Final result                 Please view results for these tests on the individual orders.    RAINBOW DRAW BLUE   RAINBOW DRAW LAVENDER   RAINBOW DRAW LIGHT GREEN   RAINBOW DRAW GOLD       ED Course as of

## 2018-07-19 NOTE — PROGRESS NOTES
Reviewed films - no evidence of rupture, aorta with 90 degree turn and less than 5 cm    Can follow up as outpatient

## 2018-07-19 NOTE — ED PROVIDER NOTES
Ct Abdomen+pelvis(contrast Only)(cpt=74177)    Result Date: 7/19/2018  PROCEDURE:  CT ABDOMEN+PELVIS (CONTRAST ONLY) (CPT=74177)  [facility]:  [facility] (CPT=76706), 2/25/2017, 9:27.   [facility], CT ABDOMEN+PELVIS(A of posterior angela and pedicle screw fixation L4-5 disc disease L5-S1. CONCLUSION:  Infrarenal abdominal aortic aneurysm which has enlarged since 8/24/2013 now measuring 4.5 x 4.4 cm in maximum AP and transverse dimension.   There is interval developmen

## 2018-07-20 ENCOUNTER — TELEPHONE (OUTPATIENT)
Dept: INTERNAL MEDICINE CLINIC | Facility: CLINIC | Age: 63
End: 2018-07-20

## 2018-07-20 NOTE — TELEPHONE ENCOUNTER
Pt was seen in the ER yesterday for low abd pain and was given dicyclomine and nothing is helping her pain-please call to advise

## 2018-07-20 NOTE — TELEPHONE ENCOUNTER
Manny Samuels MD   [facility] 16 minutes ago (3:20 PM)      Needs repeat enema and if not resolved this evening needs to go to er (Routing comment)

## 2018-07-20 NOTE — ED PROVIDER NOTES
Patient Seen in: [address] [facility]    History   Patient presents with:  Urinary Symptoms (urologic)    Stated Complaint: urinary complaint x4 days    HPI    59 y/o female presents with lower abdominal pain for 4 days.  She also rep above.    Physical Exam   ED Triage Vitals [07/19/18 1331]  BP: (!) 140/97  Pulse: 88  Resp: 18  Temp: 99.5 °F (37.5 °C)  Temp src: Temporal  SpO2: 96 %  O2 Device: None (Room air)    Current:BP (!) 140/97   Pulse 88   Temp 99.5 °F (37.5 °C) (Temporal)   R

## 2018-07-20 NOTE — TELEPHONE ENCOUNTER
Patient went to the [facility] ER yesterday due to lower abdominal pain, ER instructions are to take Dicyclomine TID.   Pt states her lower abdominal pain has not subsided, pain constant below the waist and across the abdomen, not passing gas, was told she after th

## 2018-07-20 NOTE — TELEPHONE ENCOUNTER
Patient notified to try another enema and if no results this evening pt, pt needs to go to the ER for evaluation. Pt verbalizes understanding.

## 2018-07-24 ENCOUNTER — PATIENT MESSAGE (OUTPATIENT)
Dept: INTERNAL MEDICINE CLINIC | Facility: CLINIC | Age: 63
End: 2018-07-24

## 2018-07-24 ENCOUNTER — TELEPHONE (OUTPATIENT)
Dept: INTERNAL MEDICINE CLINIC | Facility: CLINIC | Age: 63
End: 2018-07-24

## 2018-07-24 NOTE — TELEPHONE ENCOUNTER
From: Rafael Torres  To: Governalex Kumar MD  Sent: 7/24/2018 10:19 AM CDT  Subject: Other    I would like a referral to a Gastroenterologist please as a follow up to my recent emergency room visit.

## 2018-07-24 NOTE — TELEPHONE ENCOUNTER
Pt has lower abdominal pain for over a week thatis not going away and its not getting better. She did go to the [facility] last Thurs. Call given to triage due to symptoms.

## 2018-07-24 NOTE — TELEPHONE ENCOUNTER
Saúl Alonso MD   [facility] 2 hours ago (10:58 AM)     Please add pt on to the end of my day tomorrow. Clay Lester would be great if someone could see her in the office today (Routing comment)

## 2018-07-24 NOTE — TELEPHONE ENCOUNTER
Spoke with patient stating was in ER on 7/19/2018 for abdominal pain. Patient continues to complain of abdominal pain (9/10) with radiation to the right side, nausea, loss of appetite.  No fevers, No chills, No diarrhea, No vomiting, No constipation or any

## 2018-07-26 NOTE — TELEPHONE ENCOUNTER
Patient returned call. Per LL, offer patient tomorrow, 7/27/18 at 12:45 pm.  Patient accepted appointment.

## 2018-07-26 NOTE — TELEPHONE ENCOUNTER
Future Appointments  Date Time Provider Deepti Candice   7/27/2018 12:45 PM Zita Gallagher MD [facility]   8/14/2018 12:20 PM Gabo Faulkner MD [facility]   6/93/9047 4:92 PM Haseeb Junior MD [facility] Chyna Woodrow   9/11/2018 12

## 2018-07-27 ENCOUNTER — OFFICE VISIT (OUTPATIENT)
Dept: INTERNAL MEDICINE CLINIC | Facility: CLINIC | Age: 63
End: 2018-07-27
Payer: COMMERCIAL

## 2018-07-27 VITALS
SYSTOLIC BLOOD PRESSURE: 112 MMHG | WEIGHT: 193 LBS | BODY MASS INDEX: 29.25 KG/M2 | HEART RATE: 86 BPM | TEMPERATURE: 99 F | DIASTOLIC BLOOD PRESSURE: 70 MMHG | RESPIRATION RATE: 18 BRPM | HEIGHT: 68 IN

## 2018-07-27 DIAGNOSIS — I71.4 ABDOMINAL AORTIC ANEURYSM (AAA) WITHOUT RUPTURE (HCC): ICD-10-CM

## 2018-07-27 DIAGNOSIS — R10.84 GENERALIZED ABDOMINAL PAIN: ICD-10-CM

## 2018-07-27 DIAGNOSIS — R10.9 FLANK PAIN: Primary | ICD-10-CM

## 2018-07-27 LAB
APPEARANCE: CLEAR
BILIRUBIN: NEGATIVE
GLUCOSE (URINE DIPSTICK): NEGATIVE MG/DL
KETONES (URINE DIPSTICK): NEGATIVE MG/DL
LEUKOCYTES: NEGATIVE
MULTISTIX LOT#: NORMAL NUMERIC
NITRITE, URINE: NEGATIVE
OCCULT BLOOD: NEGATIVE
PH, URINE: 5.5 (ref 4.5–8)
PROTEIN (URINE DIPSTICK): NEGATIVE MG/DL
SPECIFIC GRAVITY: 1 (ref 1–1.03)
URINE-COLOR: YELLOW
UROBILINOGEN,SEMI-QN: 0.2 MG/DL (ref 0–1.9)

## 2018-07-27 PROCEDURE — 81003 URINALYSIS AUTO W/O SCOPE: CPT | Performed by: INTERNAL MEDICINE

## 2018-07-27 PROCEDURE — 99214 OFFICE O/P EST MOD 30 MIN: CPT | Performed by: INTERNAL MEDICINE

## 2018-07-27 RX ORDER — DICYCLOMINE HCL 20 MG
TABLET ORAL
Refills: 0 | COMMUNITY
Start: 2018-07-19 | End: 2018-08-15

## 2018-07-30 NOTE — PROGRESS NOTES
HPI:    Patient ID: Radha Mckeon is a 60year old female.     HPI  Here for generalized abd pain, better than when in er, ct showed constipation AAA, and read as disection,   /70   Pulse 86   Temp 99 °F (37.2 °C) (Oral)   Resp 18   Ht 68\"   Wt 193 and well-nourished. HENT:   Head: Normocephalic and atraumatic. Right Ear: External ear normal.   Left Ear: External ear normal.   Mouth/Throat: No oropharyngeal exudate. Eyes: Pupils are equal, round, and reactive to light.  Right eye exhibits no dis

## 2018-08-01 ENCOUNTER — PATIENT MESSAGE (OUTPATIENT)
Dept: INTERNAL MEDICINE CLINIC | Facility: CLINIC | Age: 63
End: 2018-08-01

## 2018-08-01 NOTE — TELEPHONE ENCOUNTER
From: Carlos Suarez  To: Jose Taveras MD  Sent: 8/1/2018 8:35 AM CDT  Subject: Visit Follow-up Question    This Monday, I was walking into work and my entire body cramped up and I was unable to walk by myself.  I assumed it was a severe case of dehyd

## 2018-08-10 ENCOUNTER — PATIENT MESSAGE (OUTPATIENT)
Dept: INTERNAL MEDICINE CLINIC | Facility: CLINIC | Age: 63
End: 2018-08-10

## 2018-08-10 NOTE — TELEPHONE ENCOUNTER
From: Karla Swift  To: Sana Gonzalez MD  Sent: 8/10/2018 11:09 AM CDT  Subject: Non-Urgent Medical Question    Dr. Dmitriy Blanton,    It has been two weeks since I experienced serve lower body/leg spasms.      I am still having trouble with the right yuli

## 2018-08-14 ENCOUNTER — OFFICE VISIT (OUTPATIENT)
Dept: INTERNAL MEDICINE CLINIC | Facility: CLINIC | Age: 63
End: 2018-08-14
Payer: COMMERCIAL

## 2018-08-14 VITALS
WEIGHT: 192 LBS | BODY MASS INDEX: 30.13 KG/M2 | HEIGHT: 67 IN | RESPIRATION RATE: 16 BRPM | HEART RATE: 78 BPM | DIASTOLIC BLOOD PRESSURE: 80 MMHG | SYSTOLIC BLOOD PRESSURE: 122 MMHG

## 2018-08-14 DIAGNOSIS — Z51.81 THERAPEUTIC DRUG MONITORING: ICD-10-CM

## 2018-08-14 DIAGNOSIS — E66.9 OBESITY (BMI 30-39.9): ICD-10-CM

## 2018-08-14 PROCEDURE — 99214 OFFICE O/P EST MOD 30 MIN: CPT | Performed by: INTERNAL MEDICINE

## 2018-08-14 RX ORDER — PHENTERMINE HYDROCHLORIDE 37.5 MG/1
37.5 TABLET ORAL
Qty: 30 TABLET | Refills: 0 | Status: SHIPPED | OUTPATIENT
Start: 2018-08-14 | End: 2018-08-14

## 2018-08-14 RX ORDER — LIRAGLUTIDE 6 MG/ML
3 INJECTION, SOLUTION SUBCUTANEOUS DAILY
Qty: 5 PEN | Refills: 1 | Status: ON HOLD | OUTPATIENT
Start: 2018-08-14 | End: 2018-08-21

## 2018-08-14 NOTE — PROGRESS NOTES
Patient teaching on [facility] performed. Pt injected Saxenda 0.6 mg in the office today with a sample pen. Patient demonstrated back, all questions were answered and patient verbalized understanding.  AKOSUA

## 2018-08-14 NOTE — PROGRESS NOTES
HISTORY OF PRESENT ILLNESS  Patient presents with:  Weight Check: down 3 lb      Kylah Stein is a 60year old female here for follow up in medical weight loss program.     Denies chest pain, shortness of breath, dizziness, blurred vision, headache, p 07/19/2018   ALB 3.8 07/19/2018   GLOBULIN 3.8 (H) 07/19/2018    07/19/2018   K 3.9 07/19/2018    07/19/2018   CO2 29.0 07/19/2018       Lab Results  Component Value Date    12/03/2016   A1C 5.6 12/03/2016       Lab Results  Component Va SAXENDA 18 MG/3ML Subcutaneous Solution Pen-injector; Inject 3 mg into the skin daily. Week 1- 0.6mg daily. Week 2- 1.2mg daily. Week 3- 1.8mg daily. Week 4- 2.4mg daily. Week 5- 3mg daily.         PLAN  · Total face to face time was 29 minutes, more than 5

## 2018-08-15 ENCOUNTER — LAB ENCOUNTER (OUTPATIENT)
Dept: LAB | Age: 63
End: 2018-08-15
Attending: PHYSICIAN ASSISTANT
Payer: COMMERCIAL

## 2018-08-15 ENCOUNTER — HOSPITAL ENCOUNTER (INPATIENT)
Facility: HOSPITAL | Age: 63
LOS: 6 days | Discharge: HOME | DRG: 269 | End: 2018-08-21
Attending: EMERGENCY MEDICINE | Admitting: SURGERY
Payer: COMMERCIAL

## 2018-08-15 ENCOUNTER — HOSPITAL ENCOUNTER (OUTPATIENT)
Dept: CT IMAGING | Facility: HOSPITAL | Age: 63
Discharge: HOME | End: 2018-08-15
Attending: PHYSICIAN ASSISTANT
Payer: COMMERCIAL

## 2018-08-15 ENCOUNTER — TELEPHONE (OUTPATIENT)
Dept: INTERNAL MEDICINE CLINIC | Facility: CLINIC | Age: 63
End: 2018-08-15

## 2018-08-15 ENCOUNTER — OFFICE VISIT (OUTPATIENT)
Dept: INTERNAL MEDICINE CLINIC | Facility: CLINIC | Age: 63
End: 2018-08-15
Payer: COMMERCIAL

## 2018-08-15 VITALS
TEMPERATURE: 99 F | BODY MASS INDEX: 29.1 KG/M2 | SYSTOLIC BLOOD PRESSURE: 118 MMHG | HEART RATE: 72 BPM | DIASTOLIC BLOOD PRESSURE: 80 MMHG | HEIGHT: 68 IN | WEIGHT: 192 LBS | RESPIRATION RATE: 14 BRPM

## 2018-08-15 DIAGNOSIS — R93.89 ABNORMAL CT SCAN: ICD-10-CM

## 2018-08-15 DIAGNOSIS — I73.9 CLAUDICATION (HCC): ICD-10-CM

## 2018-08-15 DIAGNOSIS — I71.4 ABDOMINAL AORTIC ANEURYSM (AAA) WITHOUT RUPTURE (HCC): Primary | ICD-10-CM

## 2018-08-15 DIAGNOSIS — I71.4 ABDOMINAL AORTIC ANEURYSM (AAA) WITHOUT RUPTURE (HCC): ICD-10-CM

## 2018-08-15 DIAGNOSIS — I70.209 ARTERIAL OCCLUSION, LOWER EXTREMITY (HCC): Primary | ICD-10-CM

## 2018-08-15 PROBLEM — E16.2 HYPOGLYCEMIA: Status: ACTIVE | Noted: 2018-08-15

## 2018-08-15 LAB
ALBUMIN SERPL-MCNC: 3.8 G/DL (ref 3.5–4.8)
ALBUMIN/GLOB SERPL: 1 {RATIO} (ref 1–2)
ALP LIVER SERPL-CCNC: 92 U/L (ref 50–130)
ALT SERPL-CCNC: 41 U/L (ref 14–54)
ANION GAP SERPL CALC-SCNC: 8 MMOL/L (ref 0–18)
ANION GAP SERPL CALC-SCNC: 9 MMOL/L (ref 0–18)
APTT PPP: 26 SECONDS (ref 26.1–34.6)
AST SERPL-CCNC: 26 U/L (ref 15–41)
ATRIAL RATE: 76 BPM
BASOPHILS # BLD AUTO: 0.04 X10(3) UL (ref 0–0.1)
BASOPHILS NFR BLD AUTO: 0.8 %
BILIRUB SERPL-MCNC: 0.7 MG/DL (ref 0.1–2)
BUN BLD-MCNC: 13 MG/DL (ref 8–20)
BUN BLD-MCNC: 14 MG/DL (ref 8–20)
BUN/CREAT SERPL: 12.1 (ref 10–20)
BUN/CREAT SERPL: 12.5 (ref 10–20)
CALCIUM BLD-MCNC: 10 MG/DL (ref 8.3–10.3)
CALCIUM BLD-MCNC: 9.8 MG/DL (ref 8.3–10.3)
CHLORIDE SERPL-SCNC: 104 MMOL/L (ref 101–111)
CHLORIDE SERPL-SCNC: 105 MMOL/L (ref 101–111)
CO2 SERPL-SCNC: 28 MMOL/L (ref 22–32)
CO2 SERPL-SCNC: 28 MMOL/L (ref 22–32)
CREAT BLD-MCNC: 1.07 MG/DL (ref 0.55–1.02)
CREAT BLD-MCNC: 1.12 MG/DL (ref 0.55–1.02)
EOSINOPHIL # BLD AUTO: 0.19 X10(3) UL (ref 0–0.3)
EOSINOPHIL NFR BLD AUTO: 3.7 %
ERYTHROCYTE [DISTWIDTH] IN BLOOD BY AUTOMATED COUNT: 13.7 % (ref 11.5–16)
GLOBULIN PLAS-MCNC: 3.8 G/DL (ref 2.5–3.7)
GLUCOSE BLD-MCNC: 68 MG/DL (ref 70–99)
GLUCOSE BLD-MCNC: 72 MG/DL (ref 70–99)
HCT VFR BLD AUTO: 43.1 % (ref 34–50)
HGB BLD-MCNC: 13.8 G/DL (ref 12–16)
IMMATURE GRANULOCYTE COUNT: 0.02 X10(3) UL (ref 0–1)
IMMATURE GRANULOCYTE RATIO %: 0.4 %
INR BLD: 1.03 (ref 0.9–1.1)
LYMPHOCYTES # BLD AUTO: 1.21 X10(3) UL (ref 0.9–4)
LYMPHOCYTES NFR BLD AUTO: 23.9 %
M PROTEIN MFR SERPL ELPH: 7.6 G/DL (ref 6.1–8.3)
MCH RBC QN AUTO: 27.9 PG (ref 27–33.2)
MCHC RBC AUTO-ENTMCNC: 32 G/DL (ref 31–37)
MCV RBC AUTO: 87.2 FL (ref 81–100)
MONOCYTES # BLD AUTO: 0.49 X10(3) UL (ref 0.1–1)
MONOCYTES NFR BLD AUTO: 9.7 %
NEUTROPHIL ABS PRELIM: 3.12 X10 (3) UL (ref 1.3–6.7)
NEUTROPHILS # BLD AUTO: 3.12 X10(3) UL (ref 1.3–6.7)
NEUTROPHILS NFR BLD AUTO: 61.5 %
OSMOLALITY SERPL CALC.SUM OF ELEC: 289 MOSM/KG (ref 275–295)
OSMOLALITY SERPL CALC.SUM OF ELEC: 293 MOSM/KG (ref 275–295)
P AXIS: 46 DEGREES
P-R INTERVAL: 142 MS
PLATELET # BLD AUTO: 233 10(3)UL (ref 150–450)
POTASSIUM SERPL-SCNC: 3.8 MMOL/L (ref 3.6–5.1)
POTASSIUM SERPL-SCNC: 4.1 MMOL/L (ref 3.6–5.1)
PSA SERPL DL<=0.01 NG/ML-MCNC: 13.9 SECONDS (ref 12.4–14.7)
Q-T INTERVAL: 376 MS
QRS DURATION: 68 MS
QTC CALCULATION (BEZET): 423 MS
R AXIS: 9 DEGREES
RBC # BLD AUTO: 4.94 X10(6)UL (ref 3.8–5.1)
RED CELL DISTRIBUTION WIDTH-SD: 44.1 FL (ref 35.1–46.3)
SODIUM SERPL-SCNC: 140 MMOL/L (ref 136–144)
SODIUM SERPL-SCNC: 142 MMOL/L (ref 136–144)
T AXIS: 52 DEGREES
VENTRICULAR RATE: 76 BPM
WBC # BLD AUTO: 5.1 X10(3) UL (ref 4–13)

## 2018-08-15 PROCEDURE — 75635 CT ANGIO ABDOMINAL ARTERIES: CPT | Performed by: PHYSICIAN ASSISTANT

## 2018-08-15 PROCEDURE — 80053 COMPREHEN METABOLIC PANEL: CPT | Performed by: PHYSICIAN ASSISTANT

## 2018-08-15 PROCEDURE — 99214 OFFICE O/P EST MOD 30 MIN: CPT | Performed by: PHYSICIAN ASSISTANT

## 2018-08-15 PROCEDURE — 99223 1ST HOSP IP/OBS HIGH 75: CPT | Performed by: HOSPITALIST

## 2018-08-15 PROCEDURE — 36415 COLL VENOUS BLD VENIPUNCTURE: CPT | Performed by: PHYSICIAN ASSISTANT

## 2018-08-15 RX ORDER — CEFAZOLIN SODIUM/WATER 2 G/20 ML
2 SYRINGE (ML) INTRAVENOUS
Status: DISCONTINUED | OUTPATIENT
Start: 2018-08-16 | End: 2018-08-16

## 2018-08-15 RX ORDER — CETIRIZINE HYDROCHLORIDE 10 MG/1
10 TABLET ORAL DAILY
Status: DISCONTINUED | OUTPATIENT
Start: 2018-08-16 | End: 2018-08-16

## 2018-08-15 RX ORDER — DOCUSATE SODIUM 100 MG/1
100 CAPSULE, LIQUID FILLED ORAL 2 TIMES DAILY
Status: DISCONTINUED | OUTPATIENT
Start: 2018-08-15 | End: 2018-08-16

## 2018-08-15 RX ORDER — OXYBUTYNIN CHLORIDE 10 MG/1
10 TABLET, EXTENDED RELEASE ORAL DAILY
Status: DISCONTINUED | OUTPATIENT
Start: 2018-08-16 | End: 2018-08-16

## 2018-08-15 RX ORDER — HYDROCHLOROTHIAZIDE 25 MG/1
12.5 TABLET ORAL DAILY
Status: DISCONTINUED | OUTPATIENT
Start: 2018-08-16 | End: 2018-08-16

## 2018-08-15 RX ORDER — BISACODYL 10 MG
10 SUPPOSITORY, RECTAL RECTAL
Status: DISCONTINUED | OUTPATIENT
Start: 2018-08-15 | End: 2018-08-16

## 2018-08-15 RX ORDER — PANTOPRAZOLE SODIUM 20 MG/1
20 TABLET, DELAYED RELEASE ORAL EVERY OTHER DAY
Status: DISCONTINUED | OUTPATIENT
Start: 2018-08-17 | End: 2018-08-16

## 2018-08-15 RX ORDER — HEPARIN SODIUM AND DEXTROSE 10000; 5 [USP'U]/100ML; G/100ML
18 INJECTION INTRAVENOUS ONCE
Status: COMPLETED | OUTPATIENT
Start: 2018-08-15 | End: 2018-08-15

## 2018-08-15 RX ORDER — METOCLOPRAMIDE HYDROCHLORIDE 5 MG/ML
10 INJECTION INTRAMUSCULAR; INTRAVENOUS EVERY 8 HOURS PRN
Status: DISCONTINUED | OUTPATIENT
Start: 2018-08-15 | End: 2018-08-16

## 2018-08-15 RX ORDER — SODIUM PHOSPHATE, DIBASIC AND SODIUM PHOSPHATE, MONOBASIC 7; 19 G/133ML; G/133ML
1 ENEMA RECTAL ONCE AS NEEDED
Status: DISCONTINUED | OUTPATIENT
Start: 2018-08-15 | End: 2018-08-16

## 2018-08-15 RX ORDER — LISINOPRIL 20 MG/1
20 TABLET ORAL DAILY
Status: DISCONTINUED | OUTPATIENT
Start: 2018-08-16 | End: 2018-08-16

## 2018-08-15 RX ORDER — HEPARIN SODIUM 5000 [USP'U]/ML
80 INJECTION INTRAVENOUS; SUBCUTANEOUS ONCE
Status: COMPLETED | OUTPATIENT
Start: 2018-08-15 | End: 2018-08-15

## 2018-08-15 RX ORDER — SODIUM CHLORIDE 9 MG/ML
INJECTION, SOLUTION INTRAVENOUS CONTINUOUS
Status: ACTIVE | OUTPATIENT
Start: 2018-08-15 | End: 2018-08-15

## 2018-08-15 RX ORDER — BUPROPION HYDROCHLORIDE 100 MG/1
200 TABLET, EXTENDED RELEASE ORAL DAILY
Status: DISCONTINUED | OUTPATIENT
Start: 2018-08-16 | End: 2018-08-17

## 2018-08-15 RX ORDER — ACETAMINOPHEN 500 MG
1000 TABLET ORAL ONCE
Status: COMPLETED | OUTPATIENT
Start: 2018-08-15 | End: 2018-08-15

## 2018-08-15 RX ORDER — HEPARIN SODIUM AND DEXTROSE 10000; 5 [USP'U]/100ML; G/100ML
INJECTION INTRAVENOUS CONTINUOUS
Status: DISCONTINUED | OUTPATIENT
Start: 2018-08-15 | End: 2018-08-16

## 2018-08-15 RX ORDER — BUPROPION HYDROCHLORIDE 100 MG/1
100 TABLET, EXTENDED RELEASE ORAL
Status: DISCONTINUED | OUTPATIENT
Start: 2018-08-16 | End: 2018-08-17

## 2018-08-15 RX ORDER — POLYETHYLENE GLYCOL 3350 17 G/17G
17 POWDER, FOR SOLUTION ORAL DAILY PRN
Status: DISCONTINUED | OUTPATIENT
Start: 2018-08-15 | End: 2018-08-16

## 2018-08-15 RX ORDER — ONDANSETRON 2 MG/ML
4 INJECTION INTRAMUSCULAR; INTRAVENOUS EVERY 6 HOURS PRN
Status: DISCONTINUED | OUTPATIENT
Start: 2018-08-15 | End: 2018-08-21

## 2018-08-15 RX ORDER — LORATADINE 10 MG/1
10 TABLET ORAL DAILY
COMMUNITY
End: 2018-11-19

## 2018-08-15 RX ORDER — PHENTERMINE HYDROCHLORIDE 37.5 MG/1
37.5 CAPSULE ORAL EVERY MORNING
Status: ON HOLD | COMMUNITY
End: 2018-08-21

## 2018-08-15 RX ORDER — ACETAMINOPHEN 325 MG/1
650 TABLET ORAL EVERY 6 HOURS PRN
Status: DISCONTINUED | OUTPATIENT
Start: 2018-08-15 | End: 2018-08-17

## 2018-08-15 NOTE — H&P
[facility]  History and Physical     Carlos Suarez Patient Status:  Emergency    1955 MRN [id number]   Location 6[address] [facility] Attending Emely Camacho MD   Hosp Day # 0 PCP Laney Benitez MD     Chief Complaint Comment: BENIGN CYST REMOVED  No date: OOPHORECTOMY  No date: OTHER      Comment: lumbar back surgery    Social History:  reports that she has never smoked. She has never used smokeless tobacco. She reports that she does not drink alcohol or use drugs. 100%   BMI 29.19 kg/m²   General: No acute distress. Alert and oriented x 3. HEENT: Normocephalic atraumatic. Moist mucous membranes. EOM-I. PERRLA. Anicteric. Neck: No lymphadenopathy. No JVD. No carotid bruits.   Respiratory: Clear to auscultation bilat bedside.     Danny Baig,   8/15/2018

## 2018-08-15 NOTE — TELEPHONE ENCOUNTER
Patient's  called Ulices Oas) and was asking if Dr Carlton Ravi was coming to the hospital to do a consult. Patient was seen earlier today by Dipti Farooq and Fatmata Gibbs did call and ask ER to have Dr Carlton Ravi do a consult.   After talking to Fatmata Gibbs, told pat

## 2018-08-15 NOTE — ED INITIAL ASSESSMENT (HPI)
Patient here from radiology, known DVT bilateral legs, patient denies any history of DVT, denies any chest pain or shortness of breath.  Patient breathing unlabored, even, no acute respiratory distress noted at this time, complains of bilateral severe leg p

## 2018-08-15 NOTE — TELEPHONE ENCOUNTER
Spoke with Christina @[facility] radiology regarding CT-  CONCLUSION:    1. Occlusive thrombus within the right popliteal artery with reconstitution of the tibioperoneal trunk near the bifurcation.   There is occlusive thrombus noted within the right anterior tibi

## 2018-08-15 NOTE — PLAN OF CARE
NURSING ADMISSION NOTE      Patient admitted via Cart  Oriented to room. Safety precautions initiated. Bed in low position. Call light in reach. Vascular in room.   Primary notified of admission

## 2018-08-15 NOTE — PROGRESS NOTES
Spoke with pt while she was in radiology. Referred to ER given occlusive thrombi noted on CTA with run off. Radiology staff agreed to transport pt to ER. I called ER and gave report to ER staff.

## 2018-08-15 NOTE — ED PROVIDER NOTES
Patient Seen in: [facility] Emergency Department    History   Patient presents with:  Deep Vein Thrombosis (cardiovascular)    Stated Complaint: from radiology with confirmed aaa, dvt 's in bilat legs    HPI    67-year-old female complaining of leg signs reviewed. All other systems reviewed and negative except as noted above.     Physical Exam   ED Triage Vitals [08/15/18 1452]  BP: (!) 151/102  Pulse: 80  Resp: 18  Temp: 98.9 °F (37.2 °C)  Temp src: Temporal  SpO2: 100 %  O2 Device: None (Room a Please view results for these tests on the individual orders. RAINBOW DRAW BLUE   RAINBOW DRAW LAVENDER   RAINBOW DRAW LIGHT GREEN   RAINBOW DRAW GOLD   CBC W/ DIFFERENTIAL     EKG    Rate, intervals and axes as noted on EKG Report.   Rate: 76  Rhythm

## 2018-08-15 NOTE — PROGRESS NOTES
Patient presents with:  Spasms: AB RM 6, Patient states having right leg spasms travels down to the feet       HPI:  Pt presents with c/o leg pain. Pt states that 2-3 weeks ago she had terrible pain in both calves, described as \"cramps\" when walking.   Kelli Dyer fullness        Current Outpatient Prescriptions:  SAXENDA 18 MG/3ML Subcutaneous Solution Pen-injector Inject 3 mg into the skin daily. Week 1- 0.6mg daily. Week 2- 1.2mg daily. Week 3- 1.8mg daily. Week 4- 2.4mg daily. Week 5- 3mg daily.  Disp: 5 pen Rfl: possible etiologies and needed w/u. I spoke with IR, Dr. Eli Lazaro who reviewed the CT from 7/19/18 and agreed with Dr. Keke Howard interpretation of NO dissection. He thought the abnormality could have been mural thrombus.   We discussed various ways to further e

## 2018-08-15 NOTE — TELEPHONE ENCOUNTER
Prior authorization approved on CMM  Patient is currently in the hospital so we will wait to call to inform

## 2018-08-16 ENCOUNTER — ANESTHESIA EVENT (OUTPATIENT)
Dept: CARDIAC SURGERY | Facility: HOSPITAL | Age: 63
DRG: 269 | End: 2018-08-16
Payer: COMMERCIAL

## 2018-08-16 ENCOUNTER — ANESTHESIA (OUTPATIENT)
Dept: CARDIAC SURGERY | Facility: HOSPITAL | Age: 63
DRG: 269 | End: 2018-08-16
Payer: COMMERCIAL

## 2018-08-16 ENCOUNTER — SURGERY (OUTPATIENT)
Age: 63
End: 2018-08-16

## 2018-08-16 LAB
ANION GAP SERPL CALC-SCNC: 7 MMOL/L (ref 0–18)
ANTIBODY SCREEN: NEGATIVE
APTT PPP: 182.4 SECONDS (ref 26.1–34.6)
APTT PPP: 277.9 SECONDS (ref 26.1–34.6)
BUN BLD-MCNC: 12 MG/DL (ref 8–20)
BUN/CREAT SERPL: 11.3 (ref 10–20)
CALCIUM BLD-MCNC: 9 MG/DL (ref 8.3–10.3)
CHLORIDE SERPL-SCNC: 108 MMOL/L (ref 101–111)
CO2 SERPL-SCNC: 28 MMOL/L (ref 22–32)
CREAT BLD-MCNC: 1.06 MG/DL (ref 0.55–1.02)
ERYTHROCYTE [DISTWIDTH] IN BLOOD BY AUTOMATED COUNT: 13.9 % (ref 11.5–16)
GLUCOSE BLD-MCNC: 86 MG/DL (ref 70–99)
HCT VFR BLD AUTO: 42.2 % (ref 34–50)
HGB BLD-MCNC: 13.5 G/DL (ref 12–16)
MCH RBC QN AUTO: 28.2 PG (ref 27–33.2)
MCHC RBC AUTO-ENTMCNC: 32 G/DL (ref 31–37)
MCV RBC AUTO: 88.1 FL (ref 81–100)
OSMOLALITY SERPL CALC.SUM OF ELEC: 295 MOSM/KG (ref 275–295)
PLATELET # BLD AUTO: 231 10(3)UL (ref 150–450)
POTASSIUM SERPL-SCNC: 4.2 MMOL/L (ref 3.6–5.1)
RBC # BLD AUTO: 4.79 X10(6)UL (ref 3.8–5.1)
RED CELL DISTRIBUTION WIDTH-SD: 44.6 FL (ref 35.1–46.3)
RH BLOOD TYPE: POSITIVE
SODIUM SERPL-SCNC: 143 MMOL/L (ref 136–144)
WBC # BLD AUTO: 4.7 X10(3) UL (ref 4–13)

## 2018-08-16 PROCEDURE — 04CM0ZZ EXTIRPATION OF MATTER FROM RIGHT POPLITEAL ARTERY, OPEN APPROACH: ICD-10-PCS | Performed by: SURGERY

## 2018-08-16 PROCEDURE — 04CS0ZZ EXTIRPATION OF MATTER FROM LEFT POSTERIOR TIBIAL ARTERY, OPEN APPROACH: ICD-10-PCS | Performed by: SURGERY

## 2018-08-16 PROCEDURE — 99233 SBSQ HOSP IP/OBS HIGH 50: CPT | Performed by: HOSPITALIST

## 2018-08-16 PROCEDURE — 04CT0ZZ EXTIRPATION OF MATTER FROM RIGHT PERONEAL ARTERY, OPEN APPROACH: ICD-10-PCS | Performed by: SURGERY

## 2018-08-16 PROCEDURE — 04CN0ZZ EXTIRPATION OF MATTER FROM LEFT POPLITEAL ARTERY, OPEN APPROACH: ICD-10-PCS | Performed by: SURGERY

## 2018-08-16 PROCEDURE — 04CP0ZZ EXTIRPATION OF MATTER FROM RIGHT ANTERIOR TIBIAL ARTERY, OPEN APPROACH: ICD-10-PCS | Performed by: SURGERY

## 2018-08-16 PROCEDURE — 04CR0ZZ EXTIRPATION OF MATTER FROM RIGHT POSTERIOR TIBIAL ARTERY, OPEN APPROACH: ICD-10-PCS | Performed by: SURGERY

## 2018-08-16 PROCEDURE — 04CU0ZZ EXTIRPATION OF MATTER FROM LEFT PERONEAL ARTERY, OPEN APPROACH: ICD-10-PCS | Performed by: SURGERY

## 2018-08-16 PROCEDURE — 04CQ0ZZ EXTIRPATION OF MATTER FROM LEFT ANTERIOR TIBIAL ARTERY, OPEN APPROACH: ICD-10-PCS | Performed by: SURGERY

## 2018-08-16 RX ORDER — DEXTROSE AND SODIUM CHLORIDE 5; .45 G/100ML; G/100ML
INJECTION, SOLUTION INTRAVENOUS CONTINUOUS
Status: DISCONTINUED | OUTPATIENT
Start: 2018-08-16 | End: 2018-08-17

## 2018-08-16 RX ORDER — MORPHINE SULFATE 4 MG/ML
4 INJECTION, SOLUTION INTRAMUSCULAR; INTRAVENOUS EVERY 2 HOUR PRN
Status: DISCONTINUED | OUTPATIENT
Start: 2018-08-16 | End: 2018-08-21

## 2018-08-16 RX ORDER — MORPHINE SULFATE 4 MG/ML
2 INJECTION, SOLUTION INTRAMUSCULAR; INTRAVENOUS EVERY 5 MIN PRN
Status: DISPENSED | OUTPATIENT
Start: 2018-08-16 | End: 2018-08-16

## 2018-08-16 RX ORDER — CEFAZOLIN SODIUM/WATER 2 G/20 ML
2 SYRINGE (ML) INTRAVENOUS EVERY 8 HOURS
Status: COMPLETED | OUTPATIENT
Start: 2018-08-16 | End: 2018-08-17

## 2018-08-16 RX ORDER — ONDANSETRON 2 MG/ML
4 INJECTION INTRAMUSCULAR; INTRAVENOUS AS NEEDED
Status: ACTIVE | OUTPATIENT
Start: 2018-08-16 | End: 2018-08-17

## 2018-08-16 RX ORDER — MORPHINE SULFATE 4 MG/ML
8 INJECTION, SOLUTION INTRAMUSCULAR; INTRAVENOUS EVERY 2 HOUR PRN
Status: DISCONTINUED | OUTPATIENT
Start: 2018-08-16 | End: 2018-08-21

## 2018-08-16 RX ORDER — SODIUM CHLORIDE 9 MG/ML
INJECTION, SOLUTION INTRAVENOUS CONTINUOUS
Status: DISCONTINUED | OUTPATIENT
Start: 2018-08-16 | End: 2018-08-17

## 2018-08-16 RX ORDER — BUPIVACAINE HYDROCHLORIDE 5 MG/ML
INJECTION, SOLUTION PERINEURAL AS NEEDED
Status: DISCONTINUED | OUTPATIENT
Start: 2018-08-16 | End: 2018-08-16 | Stop reason: HOSPADM

## 2018-08-16 RX ORDER — NALOXONE HYDROCHLORIDE 0.4 MG/ML
80 INJECTION, SOLUTION INTRAMUSCULAR; INTRAVENOUS; SUBCUTANEOUS AS NEEDED
Status: ACTIVE | OUTPATIENT
Start: 2018-08-16 | End: 2018-08-17

## 2018-08-16 RX ORDER — MORPHINE SULFATE 4 MG/ML
2 INJECTION, SOLUTION INTRAMUSCULAR; INTRAVENOUS EVERY 2 HOUR PRN
Status: DISCONTINUED | OUTPATIENT
Start: 2018-08-16 | End: 2018-08-21

## 2018-08-16 RX ORDER — HEPARIN SODIUM 5000 [USP'U]/ML
5000 INJECTION, SOLUTION INTRAVENOUS; SUBCUTANEOUS EVERY 12 HOURS SCHEDULED
Status: DISCONTINUED | OUTPATIENT
Start: 2018-08-17 | End: 2018-08-17

## 2018-08-16 RX ORDER — CEFAZOLIN SODIUM 1 G/3ML
INJECTION, POWDER, FOR SOLUTION INTRAMUSCULAR; INTRAVENOUS
Status: DISCONTINUED | OUTPATIENT
Start: 2018-08-16 | End: 2018-08-16 | Stop reason: HOSPADM

## 2018-08-16 RX ORDER — MORPHINE SULFATE 4 MG/ML
INJECTION, SOLUTION INTRAMUSCULAR; INTRAVENOUS
Status: DISPENSED
Start: 2018-08-16 | End: 2018-08-17

## 2018-08-16 NOTE — PAYOR COMM NOTE
--------------  ADMISSION REVIEW     Payor: [employer] PPO  Subscriber #:  [id number]  Authorization Number: N/A    Admit date: 8/15/18  Admit time: 1730       Admitting Physician: Lauri Roach DO  Attending Physician:  Nancy Cano MD  [facility] JAME GOMES  9/25/14: HAND/FINGER SURGERY UNLISTED Left      Comment: Left ring finger trigger release  38: HYSTERECTOMY  age 24: ADA NEEDLE LOCALIZATION W/ SPECIMEN 1 SITE LEFT Left      Comment: BENIGN CYST REMOVED  No date: OOPHORECTOMY  No date: OTHE components within normal limits   PROTHROMBIN TIME (PT) - Normal   CBC WITH DIFFERENTIAL WITH PLATELET    Narrative: The following orders were created for panel order CBC WITH DIFFERENTIAL WITH PLATELET.   Procedure                               [place] [facility]OSPITAL[facility]  History and Physical     Karla Swift Patient Status:  Emergency    1955 MRN [id number]   Location [facility] Attending Latasha Richards MD   Hosp Day # 0 PCP Bairon Gómez MD     Chief Complaint: Anum Saleem CYST REMOVED  No date: OOPHORECTOMY  No date: OTHER      Comment: lumbar back surgery      Allergies: No Known Allergies    Medications:    No current facility-administered medications on file prior to encounter.    Current Outpatient Prescriptions on File pulses. Chest and Back: No tenderness or deformity. Abdomen: Soft, nontender, nondistended. Positive bowel sounds. No rebound, guarding or organomegaly. Neurologic: No focal neurological deficits. CNII-XII grossly intact.   Musculoskeletal: Moves all e injection 7,000 Units     Date Action Dose Route User    8/15/2018 1606 Given 7000 Units Intravenous Gabriel Krueger RN      heparin (PORCINE) drip [id number]/250mL infusion CONTINUOUS     Date Action Dose Route User    8/16/2018 0137 Hi-Risk Rate/Dose Ch

## 2018-08-16 NOTE — ANESTHESIA PREPROCEDURE EVALUATION
PRE-OP EVALUATION    Patient Name: Pablo Yañez    Pre-op Diagnosis: B/L Popliteal Embolisms    Procedure(s):  bilateral popliteal thromboembolectomy    Surgeon(s) and Role:     Shahid Peres MD - Primary    Pre-op vitals reviewed.   Temp: 98.2 °F Oral Daily PRN   [MAR Hold] magnesium hydroxide (MILK OF MAGNESIA) 400 MG/5ML suspension 30 mL 30 mL Oral Daily PRN   [MAR Hold] bisacodyl (DULCOLAX) rectal suppository 10 mg 10 mg Rectal Daily PRN   [MAR Hold] FLEET ENEMA (FLEET) 7-19 GM/118ML enema 133 m echocardiographic images, above average exercise capacity, resting EKG abnormalities, and rapid resolution of EKG changes, this likely represents a false positive finding. ECG reviewed.   Exercise tolerance: good     MET: >4      (+) hypertension 13.9 08/16/2018   .0 08/16/2018       Lab Results  Component Value Date    08/16/2018   K 4.2 08/16/2018    08/16/2018   CO2 28.0 08/16/2018   BUN 12 08/16/2018   CREATSERUM 1.06 (H) 08/16/2018   GLU 86 08/16/2018   CA 9.0 08/16/2018

## 2018-08-16 NOTE — ANESTHESIA POSTPROCEDURE EVALUATION
[facility] Patient Status:  Inpatient   Age/Gender 60year old female MRN [id number]   [facility] 6NE-A Attending Marlon Garcia MD   Hosp Day # 1 PCP Lisa Floyd MD       Anesthesia Post-op Note    Procedure(s):

## 2018-08-16 NOTE — PLAN OF CARE
Pt A/O X4. RA. NSR on tele. VSS. Heparin running at 1350 units/hr. Denies pain. NPO after midnight. Consent for surgery signed. Pt is sleeping comfortably. Will continue to monitor.     METABOLIC/FLUID AND ELECTROLYTES - ADULT    • Electrolytes maintained w

## 2018-08-16 NOTE — PLAN OF CARE
Assumed care @ 0700. A&Ox4, VSS on RA, NSR on tele  BLE warm, no edema noted. Pedal pulses and R post tib absent. L post tib per doppler. Heparin gtt infusing  Spouse at bedside.  Updated on POC    Pt to surgery for thrombectomy    METABOLIC/FLUID AND ELECT

## 2018-08-16 NOTE — PLAN OF CARE
Received pt from OR at 1700. Pt complaining of pain in BLE at incision sites, Morphine given as ordered. Weaned to RA. NSR. Jacobs and art line in place and to remain in place for OR tomorrow. Pt and  updated on POC. Will continue to closely monitor.

## 2018-08-16 NOTE — PROGRESS NOTES
Brief Note  Chart reviewed  Vitals noted    Plan:  NPO  Start IVF  Hold Lisinopril and HCTZ  Start low dose beta blockers  Monitor hemodynamics    Discussed with ABY Sena MD

## 2018-08-16 NOTE — BRIEF OP NOTE
Pre-Operative Diagnosis: BILATERAL POPLITEAL AND TIBIAL  EMBOBLI      Post-Operative Diagnosis: BILATERAL POPLITEAL AND TIBIAL  EMBOBLI      Procedure Performed:   Procedure(s):  bilateral popliteal and tibial thromboembolectomies                   Surgeon

## 2018-08-16 NOTE — CONSULTS
[facility]  Vascular Surgery Consultation    Carlos Suarez Patient Status:  Inpatient    1955 MRN [id number]   [facility] 7NE-A Attending Phyllis Roque [facility] Day # 0 PCP Laney Benitez MD     History of Present Il for the past 3 weeks. She gets relief with stopping and resting.   The patient went for a CAT scan today which revealed embolization to the right popliteal artery with occlusion of the proximal anterior tibial and mid to distal anterior tibial artery on th stroke   • Hypertension Father    • Heart Disorder Father    • Hypertension Sister    The patient has multiple siblings. None are known to have an aneurysm. The patient has no children. There is no parental history of aneurysm.   Social History:  reports enema, Rectal, Once PRN    Review of Systems:   CONSTITUTIONAL: denies fever, chills  ENT: denies sore throat, nasal drainage/congestion  CHEST/CVS: denies cough, sputum, trouble breathing/SOB, chest pain, DOTY  GI: She has had abdominal pain as noted above Results  Component Value Date   WBC 5.1 08/15/2018   HGB 13.8 08/15/2018   HCT 43.1 08/15/2018   .0 08/15/2018   CREATSERUM 1.07 08/15/2018   BUN 13 08/15/2018    08/15/2018   K 4.1 08/15/2018    08/15/2018   CO2 28.0 08/15/2018   GLU 72 aneurysmal degeneration.   She has a long life expectancy and the nature of her aneurysm would suggest that she is likely to have further enlargement of the aorta even in the areas that are not now aneurysmal.  This would put her at higher risk for endo-josé

## 2018-08-16 NOTE — PROGRESS NOTES
[facility]  Progress Note     Andrew Garcia Patient Status:  Inpatient    1955 MRN [id number]   [facility] 7NE-A Attending Prudencio Buerger, MD   Hosp Day # 1 PCP Herminia Mccabe MD     Chief Complaint: PVD    S: Patient had s • BuPROPion HCl ER (SR)  100 mg Oral 2 times per day   • cetirizine  10 mg Oral Daily   • [START ON 8/17/2018] Pantoprazole Sodium  20 mg Oral QOD   • Oxybutynin Chloride ER  10 mg Oral Daily   • docusate sodium  100 mg Oral BID   • ceFAZolin  2 g Lenell Oc

## 2018-08-17 ENCOUNTER — SURGERY (OUTPATIENT)
Age: 63
End: 2018-08-17

## 2018-08-17 LAB
ALBUMIN SERPL-MCNC: 2.9 G/DL (ref 3.5–4.8)
ALBUMIN/GLOB SERPL: 0.9 {RATIO} (ref 1–2)
ALP LIVER SERPL-CCNC: 76 U/L (ref 50–130)
ALT SERPL-CCNC: 66 U/L (ref 14–54)
ANION GAP SERPL CALC-SCNC: 7 MMOL/L (ref 0–18)
AST SERPL-CCNC: 41 U/L (ref 15–41)
ATRIAL RATE: 69 BPM
BASOPHILS # BLD AUTO: 0.03 X10(3) UL (ref 0–0.1)
BASOPHILS NFR BLD AUTO: 0.6 %
BILIRUB SERPL-MCNC: 0.7 MG/DL (ref 0.1–2)
BLOOD TYPE BARCODE: 7300
BUN BLD-MCNC: 13 MG/DL (ref 8–20)
BUN/CREAT SERPL: 14.3 (ref 10–20)
CALCIUM BLD-MCNC: 8.1 MG/DL (ref 8.3–10.3)
CHLORIDE SERPL-SCNC: 108 MMOL/L (ref 101–111)
CO2 SERPL-SCNC: 27 MMOL/L (ref 22–32)
CREAT BLD-MCNC: 0.91 MG/DL (ref 0.55–1.02)
EOSINOPHIL # BLD AUTO: 0.14 X10(3) UL (ref 0–0.3)
EOSINOPHIL NFR BLD AUTO: 2.6 %
ERYTHROCYTE [DISTWIDTH] IN BLOOD BY AUTOMATED COUNT: 14 % (ref 11.5–16)
GLOBULIN PLAS-MCNC: 3.3 G/DL (ref 2.5–4)
GLUCOSE BLD-MCNC: 115 MG/DL (ref 70–99)
GLUCOSE BLD-MCNC: 120 MG/DL (ref 70–99)
GLUCOSE BLD-MCNC: 146 MG/DL (ref 70–99)
GLUCOSE BLD-MCNC: 194 MG/DL (ref 70–99)
HCT VFR BLD AUTO: 37.1 % (ref 34–50)
HGB BLD-MCNC: 12.1 G/DL (ref 12–16)
IMMATURE GRANULOCYTE COUNT: 0.01 X10(3) UL (ref 0–1)
IMMATURE GRANULOCYTE RATIO %: 0.2 %
ISTAT BLOOD GAS BASE DEFICIT: -4 MMOL/L
ISTAT BLOOD GAS BASE DEFICIT: -7 MMOL/L
ISTAT BLOOD GAS BASE EXCESS: 0 MMOL/L (ref ?–30)
ISTAT BLOOD GAS HCO3: 19.7 MEQ/L (ref 22–26)
ISTAT BLOOD GAS HCO3: 22.1 MEQ/L (ref 22–26)
ISTAT BLOOD GAS HCO3: 24.2 MEQ/L (ref 22–26)
ISTAT BLOOD GAS O2 SATURATION: 100 % (ref 92–100)
ISTAT BLOOD GAS PCO2: 35 MMHG (ref 35–45)
ISTAT BLOOD GAS PCO2: 40 MMHG (ref 35–45)
ISTAT BLOOD GAS PCO2: 43 MMHG (ref 35–45)
ISTAT BLOOD GAS PH: 7.27 (ref 7.35–7.45)
ISTAT BLOOD GAS PH: 7.35 (ref 7.35–7.45)
ISTAT BLOOD GAS PH: 7.45 (ref 7.35–7.45)
ISTAT BLOOD GAS PO2: 217 MMHG (ref 80–105)
ISTAT BLOOD GAS PO2: 221 MMHG (ref 80–105)
ISTAT BLOOD GAS PO2: 303 MMHG (ref 80–105)
ISTAT BLOOD GAS TCO2: 21 MMOL/L (ref 22–32)
ISTAT BLOOD GAS TCO2: 23 MMOL/L (ref 22–32)
ISTAT BLOOD GAS TCO2: 25 MMOL/L (ref 22–32)
ISTAT HEMATOCRIT: 24 % (ref 34–50)
ISTAT HEMATOCRIT: 26 % (ref 34–50)
ISTAT HEMATOCRIT: 30 % (ref 34–50)
ISTAT IONIZED CALCIUM: 1.02 MMOL/L (ref 1.12–1.32)
ISTAT IONIZED CALCIUM: 1.09 MMOL/L (ref 1.12–1.32)
ISTAT IONIZED CALCIUM: 1.1 MMOL/L (ref 1.12–1.32)
ISTAT POTASSIUM: 3.5 MMOL/L (ref 3.6–5.1)
ISTAT POTASSIUM: 3.9 MMOL/L (ref 3.6–5.1)
ISTAT POTASSIUM: 4.5 MMOL/L (ref 3.6–5.1)
ISTAT SODIUM: 140 MMOL/L (ref 136–144)
ISTAT SODIUM: 141 MMOL/L (ref 136–144)
ISTAT SODIUM: 142 MMOL/L (ref 136–144)
LYMPHOCYTES # BLD AUTO: 0.95 X10(3) UL (ref 0.9–4)
LYMPHOCYTES NFR BLD AUTO: 17.6 %
M PROTEIN MFR SERPL ELPH: 6.2 G/DL (ref 6.1–8.3)
MCH RBC QN AUTO: 29 PG (ref 27–33.2)
MCHC RBC AUTO-ENTMCNC: 32.6 G/DL (ref 31–37)
MCV RBC AUTO: 89 FL (ref 81–100)
MONOCYTES # BLD AUTO: 0.51 X10(3) UL (ref 0.1–1)
MONOCYTES NFR BLD AUTO: 9.4 %
NEUTROPHIL ABS PRELIM: 3.76 X10 (3) UL (ref 1.3–6.7)
NEUTROPHILS # BLD AUTO: 3.76 X10(3) UL (ref 1.3–6.7)
NEUTROPHILS NFR BLD AUTO: 69.6 %
OSMOLALITY SERPL CALC.SUM OF ELEC: 295 MOSM/KG (ref 275–295)
P AXIS: 58 DEGREES
P-R INTERVAL: 146 MS
PLATELET # BLD AUTO: 204 10(3)UL (ref 150–450)
POTASSIUM SERPL-SCNC: 4.1 MMOL/L (ref 3.6–5.1)
Q-T INTERVAL: 404 MS
QRS DURATION: 64 MS
QTC CALCULATION (BEZET): 432 MS
R AXIS: 7 DEGREES
RBC # BLD AUTO: 4.17 X10(6)UL (ref 3.8–5.1)
RED CELL DISTRIBUTION WIDTH-SD: 45.1 FL (ref 35.1–46.3)
SODIUM SERPL-SCNC: 142 MMOL/L (ref 136–144)
T AXIS: 44 DEGREES
VENTRICULAR RATE: 69 BPM
WBC # BLD AUTO: 5.4 X10(3) UL (ref 4–13)

## 2018-08-17 PROCEDURE — 04R00JZ REPLACEMENT OF ABDOMINAL AORTA WITH SYNTHETIC SUBSTITUTE, OPEN APPROACH: ICD-10-PCS | Performed by: SURGERY

## 2018-08-17 PROCEDURE — 99231 SBSQ HOSP IP/OBS SF/LOW 25: CPT | Performed by: HOSPITALIST

## 2018-08-17 DEVICE — IMPLANTABLE DEVICE: Type: IMPLANTABLE DEVICE | Status: FUNCTIONAL

## 2018-08-17 DEVICE — BARD® PTFE FELT, 15.2 CM X 15.2 CM
Type: IMPLANTABLE DEVICE | Status: FUNCTIONAL
Brand: BARD® PTFE FELT

## 2018-08-17 RX ORDER — BUPIVACAINE HYDROCHLORIDE 5 MG/ML
INJECTION, SOLUTION PERINEURAL AS NEEDED
Status: DISCONTINUED | OUTPATIENT
Start: 2018-08-17 | End: 2018-08-21

## 2018-08-17 RX ORDER — HEPARIN SODIUM 5000 [USP'U]/ML
5000 INJECTION, SOLUTION INTRAVENOUS; SUBCUTANEOUS EVERY 12 HOURS SCHEDULED
Status: DISCONTINUED | OUTPATIENT
Start: 2018-08-18 | End: 2018-08-21

## 2018-08-17 RX ORDER — CEFAZOLIN SODIUM/WATER 2 G/20 ML
2 SYRINGE (ML) INTRAVENOUS EVERY 8 HOURS
Status: COMPLETED | OUTPATIENT
Start: 2018-08-17 | End: 2018-08-18

## 2018-08-17 RX ORDER — BACITRACIN 50000 [USP'U]/1
INJECTION, POWDER, LYOPHILIZED, FOR SOLUTION INTRAMUSCULAR AS NEEDED
Status: DISCONTINUED | OUTPATIENT
Start: 2018-08-17 | End: 2018-08-17 | Stop reason: HOSPADM

## 2018-08-17 RX ORDER — CEFAZOLIN SODIUM 1 G/3ML
INJECTION, POWDER, FOR SOLUTION INTRAMUSCULAR; INTRAVENOUS
Status: DISCONTINUED | OUTPATIENT
Start: 2018-08-17 | End: 2018-08-17 | Stop reason: HOSPADM

## 2018-08-17 RX ORDER — DEXTROSE AND SODIUM CHLORIDE 5; .45 G/100ML; G/100ML
INJECTION, SOLUTION INTRAVENOUS CONTINUOUS
Status: DISCONTINUED | OUTPATIENT
Start: 2018-08-17 | End: 2018-08-18

## 2018-08-17 NOTE — PLAN OF CARE
Patient VSS. Jacobs, A-line remain per MD orders. Patient under control with IV medication. Surgical sites intact, with no signs of drainage or complications. NPO at midnight, plan of care updated, consent needed for AAA today.     Will continue to monit

## 2018-08-17 NOTE — PAYOR COMM NOTE
--------------  CONTINUED STAY REVIEW        8/16     TO OR      PREOPERATIVE DIAGNOSIS:  Bilateral popliteal and tibial thromboembolism. POSTOPERATIVE DIAGNOSIS:  Bilateral popliteal and tibial thromboembolism.     PROCEDURE PERFORMED:  Bilateral right hemodynamics  4. GERD  1. PPI  5. Hepatic steatosis  6.  Overactive bladder on Oxybutinin    ANCEF IV Q8  IVF  HEP GTT

## 2018-08-17 NOTE — PROGRESS NOTES
[facility]  Progress Note     Carlos Suarez Patient Status:  Inpatient    1955 MRN [id number]   [facility] 7NE-A Attending Ramon Bowman MD   Hosp Day # 2 PCP Laney Benitez MD     Chief Complaint: PVD    S: Patient doing metoprolol tartrate  12.5 mg Oral 2x Daily(Beta Blocker)   • Heparin Sodium (Porcine)  5,000 Units Subcutaneous 2 times per day   • BuPROPion HCl ER (SR)  200 mg Oral Daily   • BuPROPion HCl ER (SR)  100 mg Oral 2 times per day       ASSESSMENT / PLAN:

## 2018-08-17 NOTE — OPERATIVE REPORT
[facility]    PATIENT'S NAME: KIMMY MAGANA   ATTENDING PHYSICIAN: Harvinder Forte M.D. OPERATING PHYSICIAN: Freddy Mishra M.D.    PATIENT ACCOUNT#:   [de-identified]    LOCATION:  [address] [place] [address]  MEDICAL RECORD #:   [id number]       DATE OF BI removed and negative passes were performed. The arteries were heparinized with heparinized saline solution. A transverse arteriotomy was then closed with running 7-0 Prolene suture in the usual manner.   Prior to completion of the arterial closure, jesus usual manner. Prior to completion of the closure, appropriate flushing was performed. The artery closure was completed and flow was reestablished to the leg. Sterile Doppler examination revealed excellent distal flow.   The wound was irrigated with antib

## 2018-08-17 NOTE — PLAN OF CARE
Pt. Follows commands, pain is controlled, vitals stable, family at bedside. Report given to O.R. Nurse, pt. Went to o.r. For abdominal aortic repair with Dr. Price Jovel.

## 2018-08-18 LAB
ANION GAP SERPL CALC-SCNC: 4 MMOL/L (ref 0–18)
BASOPHILS # BLD AUTO: 0.01 X10(3) UL (ref 0–0.1)
BASOPHILS NFR BLD AUTO: 0.1 %
BUN BLD-MCNC: 14 MG/DL (ref 8–20)
BUN/CREAT SERPL: 15.1 (ref 10–20)
CALCIUM BLD-MCNC: 7.2 MG/DL (ref 8.3–10.3)
CHLORIDE SERPL-SCNC: 113 MMOL/L (ref 101–111)
CO2 SERPL-SCNC: 28 MMOL/L (ref 22–32)
CREAT BLD-MCNC: 0.93 MG/DL (ref 0.55–1.02)
EOSINOPHIL # BLD AUTO: 0 X10(3) UL (ref 0–0.3)
EOSINOPHIL NFR BLD AUTO: 0 %
ERYTHROCYTE [DISTWIDTH] IN BLOOD BY AUTOMATED COUNT: 13.9 % (ref 11.5–16)
GLUCOSE BLD-MCNC: 157 MG/DL (ref 70–99)
HCT VFR BLD AUTO: 35.1 % (ref 34–50)
HGB BLD-MCNC: 11.2 G/DL (ref 12–16)
IMMATURE GRANULOCYTE COUNT: 0.03 X10(3) UL (ref 0–1)
IMMATURE GRANULOCYTE RATIO %: 0.4 %
LYMPHOCYTES # BLD AUTO: 0.54 X10(3) UL (ref 0.9–4)
LYMPHOCYTES NFR BLD AUTO: 7.5 %
MCH RBC QN AUTO: 28.8 PG (ref 27–33.2)
MCHC RBC AUTO-ENTMCNC: 31.9 G/DL (ref 31–37)
MCV RBC AUTO: 90.2 FL (ref 81–100)
MONOCYTES # BLD AUTO: 0.47 X10(3) UL (ref 0.1–1)
MONOCYTES NFR BLD AUTO: 6.5 %
NEUTROPHIL ABS PRELIM: 6.14 X10 (3) UL (ref 1.3–6.7)
NEUTROPHILS # BLD AUTO: 6.14 X10(3) UL (ref 1.3–6.7)
NEUTROPHILS NFR BLD AUTO: 85.5 %
OSMOLALITY SERPL CALC.SUM OF ELEC: 304 MOSM/KG (ref 275–295)
PLATELET # BLD AUTO: 144 10(3)UL (ref 150–450)
POTASSIUM SERPL-SCNC: 3.9 MMOL/L (ref 3.6–5.1)
RBC # BLD AUTO: 3.89 X10(6)UL (ref 3.8–5.1)
RED CELL DISTRIBUTION WIDTH-SD: 45.6 FL (ref 35.1–46.3)
SODIUM SERPL-SCNC: 145 MMOL/L (ref 136–144)
WBC # BLD AUTO: 7.2 X10(3) UL (ref 4–13)

## 2018-08-18 PROCEDURE — 99233 SBSQ HOSP IP/OBS HIGH 50: CPT | Performed by: HOSPITALIST

## 2018-08-18 RX ORDER — ASPIRIN 325 MG
325 TABLET ORAL DAILY
Status: DISCONTINUED | OUTPATIENT
Start: 2018-08-18 | End: 2018-08-21

## 2018-08-18 RX ORDER — POLYETHYLENE GLYCOL 3350 17 G/17G
17 POWDER, FOR SOLUTION ORAL DAILY
Status: DISCONTINUED | OUTPATIENT
Start: 2018-08-18 | End: 2018-08-21

## 2018-08-18 RX ORDER — BISACODYL 10 MG
10 SUPPOSITORY, RECTAL RECTAL ONCE
Status: COMPLETED | OUTPATIENT
Start: 2018-08-18 | End: 2018-08-18

## 2018-08-18 RX ORDER — LISINOPRIL 10 MG/1
10 TABLET ORAL DAILY
Status: DISCONTINUED | OUTPATIENT
Start: 2018-08-18 | End: 2018-08-21

## 2018-08-18 RX ORDER — FAMOTIDINE 20 MG/1
20 TABLET ORAL 2 TIMES DAILY
Status: DISCONTINUED | OUTPATIENT
Start: 2018-08-18 | End: 2018-08-20

## 2018-08-18 RX ORDER — DOCUSATE SODIUM 100 MG/1
100 CAPSULE, LIQUID FILLED ORAL 2 TIMES DAILY
Status: DISCONTINUED | OUTPATIENT
Start: 2018-08-18 | End: 2018-08-21

## 2018-08-18 NOTE — BRIEF OP NOTE
Pre-Operative Diagnosis: symptomatic abdominal aortic aneurysm with clot and emolism to both legs     Post-Operative Diagnosis: same      Procedure Performed:   Procedure(s):  open abdominal aortic aneurysm repair   With [id number] 18 x 9 mm Dacron bif

## 2018-08-18 NOTE — PLAN OF CARE
Pt. Follows commands, ambulating halls, vitals stable, pain is controlled,  at bedside, lower extremity pulses intact, will continue to monitor.

## 2018-08-18 NOTE — OPERATIVE REPORT
[facility]    PATIENT'S NAME: KIMMY MAGANA   ATTENDING PHYSICIAN: Harvinder Forte M.D. OPERATING PHYSICIAN: Jaden Alonso M.D.    PATIENT ACCOUNT#:   [de-identified]    LOCATION:  [address] [place] [address]  MEDICAL RECORD #:   [id number]       DATE OF BI patient's right. The small bowel was eviscerated and the retroperitoneum opened. The aneurysm was dissected free up to the level of the renal artery and the Bookwalter was then placed in the usual manner.   A dissection was carried down to the aortic bifu stable. Similarly, the left common iliac artery was transected and mobilized. The clamp was moved distally and a portion of the artery excised.   The graft limb was trimmed to the appropriate length and sewn in an end-to-end fashion to the left common

## 2018-08-18 NOTE — PROGRESS NOTES
[facility]  Progress Note     Jazmine Garcia Patient Status:  Inpatient    1955 MRN [id number]   [facility] 7NE-A Attending Carleen Dunaway MD   Hosp Day # 3 PCP Ialna Luciano MD     Chief Complaint: PVD    S: Patient doing Epic.    Medications:   • bisacodyl  10 mg Rectal Once   • docusate sodium  100 mg Oral BID   • PEG 3350  17 g Oral Daily   • aspirin  325 mg Oral Daily   • famoTIDine  20 mg Oral BID   • Heparin Sodium (Porcine)  5,000 Units Subcutaneous 2 times per day

## 2018-08-18 NOTE — PROGRESS NOTES
Doing well  No complaints  Feet warm, dressings intact    Transfer to floor  Ambulate  PT eval   Suppository today

## 2018-08-19 LAB
ANION GAP SERPL CALC-SCNC: 6 MMOL/L (ref 0–18)
BASOPHILS # BLD AUTO: 0.03 X10(3) UL (ref 0–0.1)
BASOPHILS NFR BLD AUTO: 0.4 %
BLOOD TYPE BARCODE: 7300
BUN BLD-MCNC: 15 MG/DL (ref 8–20)
BUN/CREAT SERPL: 19.2 (ref 10–20)
CALCIUM BLD-MCNC: 8.1 MG/DL (ref 8.3–10.3)
CHLORIDE SERPL-SCNC: 110 MMOL/L (ref 101–111)
CO2 SERPL-SCNC: 27 MMOL/L (ref 22–32)
CREAT BLD-MCNC: 0.78 MG/DL (ref 0.55–1.02)
EOSINOPHIL # BLD AUTO: 0.01 X10(3) UL (ref 0–0.3)
EOSINOPHIL NFR BLD AUTO: 0.1 %
ERYTHROCYTE [DISTWIDTH] IN BLOOD BY AUTOMATED COUNT: 13.4 % (ref 11.5–16)
GLUCOSE BLD-MCNC: 118 MG/DL (ref 70–99)
HCT VFR BLD AUTO: 32.4 % (ref 34–50)
HGB BLD-MCNC: 10.5 G/DL (ref 12–16)
IMMATURE GRANULOCYTE COUNT: 0.04 X10(3) UL (ref 0–1)
IMMATURE GRANULOCYTE RATIO %: 0.6 %
LYMPHOCYTES # BLD AUTO: 0.83 X10(3) UL (ref 0.9–4)
LYMPHOCYTES NFR BLD AUTO: 11.5 %
MCH RBC QN AUTO: 28.8 PG (ref 27–33.2)
MCHC RBC AUTO-ENTMCNC: 32.4 G/DL (ref 31–37)
MCV RBC AUTO: 89 FL (ref 81–100)
MONOCYTES # BLD AUTO: 0.74 X10(3) UL (ref 0.1–1)
MONOCYTES NFR BLD AUTO: 10.2 %
NEUTROPHIL ABS PRELIM: 5.58 X10 (3) UL (ref 1.3–6.7)
NEUTROPHILS # BLD AUTO: 5.58 X10(3) UL (ref 1.3–6.7)
NEUTROPHILS NFR BLD AUTO: 77.2 %
OSMOLALITY SERPL CALC.SUM OF ELEC: 298 MOSM/KG (ref 275–295)
PLATELET # BLD AUTO: 145 10(3)UL (ref 150–450)
POTASSIUM SERPL-SCNC: 4 MMOL/L (ref 3.6–5.1)
RBC # BLD AUTO: 3.64 X10(6)UL (ref 3.8–5.1)
RED CELL DISTRIBUTION WIDTH-SD: 44.1 FL (ref 35.1–46.3)
SODIUM SERPL-SCNC: 143 MMOL/L (ref 136–144)
WBC # BLD AUTO: 7.2 X10(3) UL (ref 4–13)

## 2018-08-19 PROCEDURE — 99232 SBSQ HOSP IP/OBS MODERATE 35: CPT | Performed by: HOSPITALIST

## 2018-08-19 RX ORDER — ACETAMINOPHEN 325 MG/1
650 TABLET ORAL EVERY 6 HOURS PRN
Status: DISCONTINUED | OUTPATIENT
Start: 2018-08-19 | End: 2018-08-21

## 2018-08-19 NOTE — PHYSICAL THERAPY NOTE
PHYSICAL THERAPY EVALUATION - INPATIENT     Room Number: 5188/5852-W  Evaluation Date: 8/19/2018  Type of Evaluation: Initial  Physician Order: PT Eval and Treat (FWB)    Presenting Problem: s/p B poplitieal and tibial thromboembolectomies 8/16/18, s Equipment: None  Patient Regularly Uses: None    Prior Level of Raysal: Pt typically independent with self care and mobility. Works as , works out at gym frequently.     SUBJECTIVE  \"I've been walking some\"    Patient self-stated goal i Climbing 3-5 steps with a railing?: A Little       AM-PAC Score:  Raw Score: 19   PT Approx Degree of Impairment Score: 41.77%   Standardized Score (AM-PAC Scale): 45.44   CMS Modifier (G-Code): CK    FUNCTIONAL ABILITY STATUS  Gait Assessment   Gait Ass pt return home. The patient scores 11/20 on the Elderly Mobility Scale, indicating patient is borderline in terms of safe mobility and will require some assist with ADL's and mobility in home.  Based on this evaluation, patient's clinical presentation is sta

## 2018-08-19 NOTE — PLAN OF CARE
PAIN - ADULT    • Verbalizes/displays adequate comfort level or patient's stated pain goal Progressing          Patient VSS. Surgical sites stable with no complications. Pain under control with meds.   Up to chair, updated with plan of care, will be able

## 2018-08-19 NOTE — PROGRESS NOTES
NURSING ADMISSION NOTE      Patient admitted via CNICU  Oriented to room. Safety precautions initiated. Bed in low position. Call light in reach.     Assumed care of patient at 1400  AOx4, RA, NSR/ST with ambulation on tele  Reports mild discomfort 2

## 2018-08-19 NOTE — PROGRESS NOTES
[facility]  Progress Note     Kareen Ling Patient Status:  Inpatient    1955 MRN [id number]   [facility] 7NE-A Attending Belkis King MD   Hosp Day # 4 PCP Wesley Osler, MD     Chief Complaint: legs and abd pain    S: Pa = values in this interval not displayed. Estimated Creatinine Clearance: 74.5 mL/min (based on SCr of 0.78 mg/dL). Recent Labs   Lab  08/15/18   1603   PTP  13.9   INR  1.03       No results for input(s): TROP, CK in the last 168 hours.          Im

## 2018-08-19 NOTE — PLAN OF CARE
Pt. Follows commands, moves all extremities equally, pain is controlled, ambulating halls, family at bedside, pt. Can transfer to room 7607, report called to [facility] all personal belongings with patient, will continue to monitor.

## 2018-08-20 RX ORDER — HYDROCODONE BITARTRATE AND ACETAMINOPHEN 10; 325 MG/1; MG/1
2 TABLET ORAL EVERY 6 HOURS PRN
Status: DISCONTINUED | OUTPATIENT
Start: 2018-08-20 | End: 2018-08-21

## 2018-08-20 RX ORDER — DOCUSATE SODIUM 100 MG/1
100 CAPSULE, LIQUID FILLED ORAL 2 TIMES DAILY
Status: DISCONTINUED | OUTPATIENT
Start: 2018-08-20 | End: 2018-08-21

## 2018-08-20 RX ORDER — SCOLOPAMINE TRANSDERMAL SYSTEM 1 MG/1
1 PATCH, EXTENDED RELEASE TRANSDERMAL
Status: DISCONTINUED | OUTPATIENT
Start: 2018-08-20 | End: 2018-08-21

## 2018-08-20 RX ORDER — HYDROCODONE BITARTRATE AND ACETAMINOPHEN 10; 325 MG/1; MG/1
1 TABLET ORAL EVERY 6 HOURS PRN
Status: DISCONTINUED | OUTPATIENT
Start: 2018-08-20 | End: 2018-08-21

## 2018-08-20 RX ORDER — PANTOPRAZOLE SODIUM 40 MG/1
40 TABLET, DELAYED RELEASE ORAL
Status: DISCONTINUED | OUTPATIENT
Start: 2018-08-20 | End: 2018-08-21

## 2018-08-20 RX ORDER — METOCLOPRAMIDE HYDROCHLORIDE 5 MG/ML
10 INJECTION INTRAMUSCULAR; INTRAVENOUS EVERY 6 HOURS PRN
Status: DISCONTINUED | OUTPATIENT
Start: 2018-08-20 | End: 2018-08-21

## 2018-08-20 RX ORDER — CALCIUM CARBONATE 200(500)MG
500 TABLET,CHEWABLE ORAL
Status: DISCONTINUED | OUTPATIENT
Start: 2018-08-20 | End: 2018-08-21

## 2018-08-20 NOTE — PLAN OF CARE
Assumed care @ 0700. Pt a/o x4, VSS. Tele SR. No acute respiratory distress noted. Complaint fo abdominal pain, relieved with PRN morphuine. Pt ambulated ad tatiana. Surgical dressings c/d/i. Pulses by doppler. No other complaints made.   Will continue

## 2018-08-20 NOTE — PHYSICAL THERAPY NOTE
PHYSICAL THERAPY TREATMENT NOTE - INPATIENT    Room Number: 4337/3722-T     Session: 1   Number of Visits to Meet Established Goals: 3    Presenting Problem: s/p B poplitieal and tibial thromboembolectomies 8/16/18, s/p open abdominal aortic aneurysm rep patient currently have. ..  -   Turning over in bed (including adjusting bedclothes, sheets and blankets)?: None   -   Sitting down on and standing up from a chair with arms (e.g., wheelchair, bedside commode, etc.): None   -   Moving from lying on back to DISCHARGE RECOMMENDATIONS  PT Discharge Recommendations: Home     PLAN  PT Treatment Plan: Bed mobility; Energy conservation; Endurance; Body mechanics; Patient education;Gait training;Strengthening;Transfer training;Stair training  Rehab Potential : Goo

## 2018-08-20 NOTE — CM/SW NOTE
08/20/18 0900   CM/SW Screening   Referral Source Social Work (self-referral)   [address] staff; Chart review     Patient was screened for DC planning and no needs are identified at this time. RN to contact SW/CM if needs arise.

## 2018-08-20 NOTE — PROGRESS NOTES
Doing ok  Mild indigestion  Feet warm with doppelr signals  Dressings intact  Can shower  Continue PT

## 2018-08-20 NOTE — PROGRESS NOTES
Pharmacy note: Duplicate Proton Pump Inhibitor with Histamine 2 blocker. Pedro Falcon is a 60year old female who has been prescribed both Famotidine and Protonix.   Pepcid was discontinued per P&T approved protocol for duplicate therapy in adult papo

## 2018-08-20 NOTE — PLAN OF CARE
Assumed care of pt 1930. Aox4. Abdominal incision with dressing D/I, old drainage present. Abdominal binder. bilat calf with kerlex dressing C/D/I. bilat ft warm to touch. Pt denies N/T. Lt pedal pulse by doppler, rt pedal palpable.  PRN tylenol given for a

## 2018-08-21 VITALS
HEIGHT: 68 IN | SYSTOLIC BLOOD PRESSURE: 141 MMHG | HEART RATE: 73 BPM | TEMPERATURE: 98 F | BODY MASS INDEX: 30.14 KG/M2 | WEIGHT: 198.88 LBS | OXYGEN SATURATION: 97 % | RESPIRATION RATE: 18 BRPM | DIASTOLIC BLOOD PRESSURE: 80 MMHG

## 2018-08-21 PROCEDURE — 99238 HOSP IP/OBS DSCHRG MGMT 30/<: CPT | Performed by: HOSPITALIST

## 2018-08-21 RX ORDER — MAGNESIUM HYDROXIDE/ALUMINUM HYDROXICE/SIMETHICONE 120; 1200; 1200 MG/30ML; MG/30ML; MG/30ML
30 SUSPENSION ORAL 4 TIMES DAILY PRN
Status: DISCONTINUED | OUTPATIENT
Start: 2018-08-21 | End: 2018-08-21

## 2018-08-21 NOTE — PLAN OF CARE
NURSING DISCHARGE NOTE    Discharged Home via Wheelchair. Accompanied by Support staff  Belongings Taken by patient/family. VSS. IV removed without s/s of complications. Steristrips to bilat legs and abd without s/s of complications.  Discharge instru

## 2018-08-21 NOTE — PLAN OF CARE
Patient alert and oriented times four. Vital signs stable. Patient complains of nausea and indigestion. Laura RN gave zofran at [address] Ed Riley RN gave Tums and Pepcid. Still not controlled.  Spoke with Dr. Herve Watkins and order for reglan and scoplamine patch put

## 2018-08-21 NOTE — DISCHARGE SUMMARY
[facility] [place] [facility]  DISCHARGE SUMMARY     Brittney Leal Patient Status:  Inpatient    1955 MRN [id number]   [facility] 7NE-A Attending Vaibhav Posadas MD   [facility] Day # 6 PCP Janyce Krabbe, MD     Date of Admission: 8/15/2018  Date of D WELLBUTRIN XL      Take 1 tablet (150 mg total) by mouth daily. Quantity:  90 tablet  Refills:  3     BuPROPion HCl ER (XL) 300 MG Tb24  Commonly known as:  WELLBUTRIN XL      Take 1 tablet (300 mg total) by mouth daily.    Quantity:  90 tablet  Refills: edema.  -----------------------------------------------------------------------------------------------  PATIENT DISCHARGE INSTRUCTIONS: See electronic chart    Justice Campbell MD 8/21/2018    Time spent:  > 30 minutes

## 2018-08-22 ENCOUNTER — PATIENT OUTREACH (OUTPATIENT)
Dept: CASE MANAGEMENT | Age: 63
End: 2018-08-22

## 2018-08-23 NOTE — CM/SW NOTE
08/23/18 0900   Discharge disposition   Expected discharge disposition Home or Self   Name of [address] [place] [address] services after discharge None   Discharge transportation Private car

## 2018-08-24 ENCOUNTER — TELEPHONE (OUTPATIENT)
Dept: INTERNAL MEDICINE CLINIC | Facility: CLINIC | Age: 63
End: 2018-08-24

## 2018-08-24 NOTE — TELEPHONE ENCOUNTER
Patient canceled upcoming [place] appointment:    Appointment canceled for Tal Zavala ([id number])   Visit Type: [address]   Date        Time      Length    Provider                  Department   8/29/2018    2:30 PM  30 mins. Marco Rabago

## 2018-08-25 NOTE — TELEPHONE ENCOUNTER
No more labs before apt
Patient had cbc, cmp and lipid completed in Feb 2017, do you want additional labs for cpe?
Please advise pt that additional labs are needed at this time
Spoke with pt informed her she is not due for labs at this time. Pt thankful for the call.
I have personally evaluated and examined the patient. The Attending was available to me as a supervising provider if needed.

## 2018-08-27 PROBLEM — Z95.828 S/P AAA REPAIR USING BIFURCATION GRAFT: Status: ACTIVE | Noted: 2018-08-27

## 2018-08-27 PROBLEM — Z86.79 S/P AAA REPAIR USING BIFURCATION GRAFT: Status: ACTIVE | Noted: 2018-08-27

## 2018-08-28 NOTE — PAYOR COMM NOTE
--------------  DISCHARGE REVIEW    Payor: [employer]IC FONSECA  Subscriber #:  [id number]  Authorization Number: [id number]    Admit date: 8/15/18  Admit time:  6760  Discharge Date: 8/21/2018  1:15 PM       8/19/18 POD #2    1.  Peripheral vascular disease with cla

## 2018-08-29 NOTE — PAYOR COMM NOTE
--------------  DISCHARGE REVIEW    Payor: DOMINIC FONSECA  Subscriber #:  [id number]  Authorization Number: [id number]    Admit date: 8/15/18  Admit time:  7361  Discharge Date: 8/21/2018  1:15 PM     Admitting Physician: Raffy Porter MD  Attending Physi graft.  She did well postop and she was monitored in CCU and then transferred to cardiac telemetry. She developed nausea GERD symptoms and constipation with mild ileus that all resolved before she was discharged home.     Procedures during hospitalization: Positive bowel sounds. No rebound or guarding. Neurologic: No focal neurological deficits. Musculoskeletal: Moves all extremities. Extremities: No edema.     PATIENT DISCHARGE INSTRUCTIONS: See electronic chart    Michelle Roberson MD 8/21/2018      REVIEWER

## 2018-08-29 NOTE — PAYOR COMM NOTE
--------------  CONTINUED STAY REVIEW    Payor: DOMINIC FONSECA  Subscriber #:  [id number]  Authorization Number: [id number]    Admit date: 8/15/18  Admit time: 1730    Admitting Physician: Hammad Blas MD  Attending Physician:  No att. providers found  P to hold HCTZ  3. Monitor hemodynamics  4. GERD  1. PPI  5. Hepatic steatosis  6. Overactive bladder on Oxybutinin  Quality:  · DVT Prophylaxis: Heparin   Estimated date of discharge: TBD  Discharge is dependent on: Clinical course  At this point Ms. Elana Smith abdominal wall and the omentum and also between the omentum and the small bowel. These were carefully taken down and the omentum reflected to the patient's right. The small bowel was eviscerated and the retroperitoneum opened.   The aneurysm was dissected anastomosis, appropriate flushing was performed. The anastomosis was completed and flow was reestablished to the right leg. The patient remained stable.       Similarly, the left common iliac artery was transected and mobilized.   The clamp was moved dist

## 2018-09-04 ENCOUNTER — PATIENT MESSAGE (OUTPATIENT)
Dept: INTERNAL MEDICINE CLINIC | Facility: CLINIC | Age: 63
End: 2018-09-04

## 2018-09-04 NOTE — TELEPHONE ENCOUNTER
From: Rafael Torres  To: Mila Bhatt PA-C  Sent: 9/4/2018 8:30 AM CDT  Subject: Visit Follow-up Question    Ruiz Clinton,  I have read your recent \"form\" letter sent to me via [employer].  I contacted your office several times after leaving the hospital

## 2018-09-04 NOTE — TELEPHONE ENCOUNTER
CB-patient was scheduled for HFU on 8/29/2018, do you want her to r/s or continue following up with Dr. Leonardo Weeks? Please advise.

## 2018-09-10 ENCOUNTER — TELEPHONE (OUTPATIENT)
Dept: INTERNAL MEDICINE CLINIC | Facility: CLINIC | Age: 63
End: 2018-09-10

## 2018-09-10 DIAGNOSIS — E66.9 OBESITY (BMI 30-39.9): Primary | ICD-10-CM

## 2018-09-11 NOTE — TELEPHONE ENCOUNTER
As I informed the pt, I want her to follow up with her surgeon but in our office as well. If she needs to reschedule that is fine.

## 2018-10-11 PROBLEM — R10.84 ABDOMINAL PAIN, GENERALIZED: Status: ACTIVE | Noted: 2018-10-11

## 2018-10-11 PROBLEM — R10.9 STOMACH ACHE: Status: ACTIVE | Noted: 2018-10-11

## 2018-10-11 PROBLEM — R19.4 FREQUENT BOWEL MOVEMENTS: Status: ACTIVE | Noted: 2018-10-11

## 2018-10-11 PROBLEM — R12 HEARTBURN: Status: ACTIVE | Noted: 2018-10-11

## 2018-10-11 PROBLEM — D13.2 DUODENAL ADENOMA: Status: ACTIVE | Noted: 2018-10-11

## 2018-10-11 PROBLEM — R13.19 OTHER DYSPHAGIA: Status: ACTIVE | Noted: 2018-10-11

## 2018-10-12 RX ORDER — TOLTERODINE 4 MG/1
CAPSULE, EXTENDED RELEASE ORAL
Qty: 90 CAPSULE | Refills: 1 | OUTPATIENT
Start: 2018-10-12

## 2018-10-12 RX ORDER — LISINOPRIL 20 MG/1
TABLET ORAL
Qty: 90 TABLET | Refills: 0 | OUTPATIENT
Start: 2018-10-12

## 2018-10-12 RX ORDER — HYDROCHLOROTHIAZIDE 12.5 MG/1
TABLET ORAL
Qty: 90 TABLET | Refills: 0 | OUTPATIENT
Start: 2018-10-12

## 2018-10-12 NOTE — TELEPHONE ENCOUNTER
Refills sent on 5/18/18 for a 90 day supply and 3 refills to [facility] too soon to fill.

## 2018-10-15 ENCOUNTER — PATIENT MESSAGE (OUTPATIENT)
Dept: INTERNAL MEDICINE CLINIC | Facility: CLINIC | Age: 63
End: 2018-10-15

## 2018-10-15 NOTE — TELEPHONE ENCOUNTER
From: Maribell Akhtar  To: Brie Blake MD  Sent: 10/15/2018 4:02 PM CDT  Subject: Prescription Question    Dr. Bruce Zavala,    [facility] has been trying to contact your office for prescription refills on the medicine I have been taking for years.  Is th

## 2018-10-16 ENCOUNTER — OFFICE VISIT (OUTPATIENT)
Dept: INTERNAL MEDICINE CLINIC | Facility: CLINIC | Age: 63
End: 2018-10-16
Payer: COMMERCIAL

## 2018-10-16 ENCOUNTER — APPOINTMENT (OUTPATIENT)
Dept: LAB | Age: 63
End: 2018-10-16
Attending: INTERNAL MEDICINE
Payer: COMMERCIAL

## 2018-10-16 VITALS
HEART RATE: 80 BPM | SYSTOLIC BLOOD PRESSURE: 122 MMHG | BODY MASS INDEX: 26.84 KG/M2 | HEIGHT: 67 IN | RESPIRATION RATE: 16 BRPM | DIASTOLIC BLOOD PRESSURE: 78 MMHG | WEIGHT: 171 LBS

## 2018-10-16 DIAGNOSIS — E66.9 OBESITY (BMI 30-39.9): ICD-10-CM

## 2018-10-16 DIAGNOSIS — E53.8 VITAMIN B 12 DEFICIENCY: ICD-10-CM

## 2018-10-16 DIAGNOSIS — E55.9 VITAMIN D DEFICIENCY: ICD-10-CM

## 2018-10-16 DIAGNOSIS — Z51.81 ENCOUNTER FOR THERAPEUTIC DRUG MONITORING: Primary | ICD-10-CM

## 2018-10-16 PROCEDURE — 99213 OFFICE O/P EST LOW 20 MIN: CPT | Performed by: INTERNAL MEDICINE

## 2018-10-16 PROCEDURE — 82607 VITAMIN B-12: CPT | Performed by: INTERNAL MEDICINE

## 2018-10-16 PROCEDURE — 82306 VITAMIN D 25 HYDROXY: CPT | Performed by: INTERNAL MEDICINE

## 2018-10-16 RX ORDER — PEN NEEDLE, DIABETIC 30 GX3/16"
1 NEEDLE, DISPOSABLE MISCELLANEOUS DAILY
Qty: 100 EACH | Refills: 1 | Status: SHIPPED | OUTPATIENT
Start: 2018-10-16 | End: 2018-11-19

## 2018-10-16 RX ORDER — LIRAGLUTIDE 6 MG/ML
3 INJECTION, SOLUTION SUBCUTANEOUS DAILY
Qty: 5 PEN | Refills: 2 | Status: SHIPPED | OUTPATIENT
Start: 2018-10-16 | End: 2018-11-19

## 2018-10-16 RX ORDER — LIRAGLUTIDE 6 MG/ML
INJECTION, SOLUTION SUBCUTANEOUS
Refills: 2 | COMMUNITY
Start: 2018-10-10 | End: 2018-10-16

## 2018-10-16 NOTE — PROGRESS NOTES
HISTORY OF PRESENT ILLNESS  Patient presents with:  Weight Check: down 21 lb      Preston Velazquez is a 60year old female here for follow up in medical weight loss program.     Denies chest pain, shortness of breath, dizziness, blurred vision, headache, CA 8.1 (L) 08/19/2018    OSMOCALC 298 (H) 08/19/2018    ALKPHO 76 08/17/2018    AST 41 08/17/2018    ALT 66 (H) 08/17/2018    BILT 0.7 08/17/2018    TP 6.2 08/17/2018    ALB 2.9 (L) 08/17/2018    GLOBULIN 3.3 08/17/2018     08/19/2018    K 4.0 08/ monitoring    Obesity (BMI 30-39. 9)    Vitamin D deficiency  -     VITAMIN D, 25-HYDROXY; Future    Vitamin B 12 deficiency  -     VITAMIN B12; Future    Other orders  -     SAXENDA 18 MG/3ML Subcutaneous Solution Pen-injector;  Inject 3 mg into the skin da

## 2018-10-17 ENCOUNTER — PATIENT MESSAGE (OUTPATIENT)
Dept: INTERNAL MEDICINE CLINIC | Facility: CLINIC | Age: 63
End: 2018-10-17

## 2018-10-17 RX ORDER — ESCITALOPRAM OXALATE 5 MG/1
5 TABLET ORAL
Qty: 90 TABLET | Refills: 1 | Status: SHIPPED | OUTPATIENT
Start: 2018-10-17 | End: 2018-11-19

## 2018-10-17 RX ORDER — ESCITALOPRAM OXALATE 5 MG/1
TABLET ORAL
Qty: 90 TABLET | Refills: 0 | OUTPATIENT
Start: 2018-10-17

## 2018-10-17 NOTE — TELEPHONE ENCOUNTER
Neela Rice, RN 10/17/2018 11:02 AM CDT        ----- Message -----  From: Adela Mishra  Sent: 10/17/2018 10:59 AM  To: [facility] Clinical Staff  Subject: Prescription Question     ----- Message from Generic, Mychart sent at 10/17/2018 10:59 AM CDT -----

## 2018-10-17 NOTE — TELEPHONE ENCOUNTER
Automated request from [employer]. Pt no longer takes Escitalopram. See pt email from 411/18. From: Maribell Akhtar  To:  Brie Blake MD  Sent: 6/18/2018  9:49 AM CDT  Subject: Prescription Question     Doctor,  Recently you approved a refill for

## 2018-10-25 ENCOUNTER — PATIENT MESSAGE (OUTPATIENT)
Dept: INTERNAL MEDICINE CLINIC | Facility: CLINIC | Age: 63
End: 2018-10-25

## 2018-10-25 NOTE — TELEPHONE ENCOUNTER
From: Tim Durbin  To: Annika Mckeon MD  Sent: 10/25/2018 6:27 AM CDT  Subject: Non-Urgent Medical Question    My yearly Mammogram is due in November. Please create an order so I can make an appointment. Thank you.

## 2018-10-30 RX ORDER — SODIUM CHLORIDE, SODIUM LACTATE, POTASSIUM CHLORIDE, CALCIUM CHLORIDE 600; 310; 30; 20 MG/100ML; MG/100ML; MG/100ML; MG/100ML
INJECTION, SOLUTION INTRAVENOUS CONTINUOUS
Status: CANCELLED | OUTPATIENT
Start: 2018-10-30

## 2018-11-01 ENCOUNTER — PATIENT MESSAGE (OUTPATIENT)
Dept: INTERNAL MEDICINE CLINIC | Facility: CLINIC | Age: 63
End: 2018-11-01

## 2018-11-01 NOTE — TELEPHONE ENCOUNTER
From: Allan Navarro  To: Maribel Huston MD  Sent: 11/1/2018 8:14 AM CDT  Subject: Other    Dr. Fonseca Held,  On my recent visit to your office on October 16, 2018, I was asked to pay a $25.00 Co-Pay.  As of September 1, 2018 I had already reached my out of pocket e

## 2018-11-06 ENCOUNTER — ANESTHESIA (OUTPATIENT)
Dept: ENDOSCOPY | Facility: HOSPITAL | Age: 63
End: 2018-11-06
Payer: COMMERCIAL

## 2018-11-06 ENCOUNTER — ANESTHESIA EVENT (OUTPATIENT)
Dept: ENDOSCOPY | Facility: HOSPITAL | Age: 63
End: 2018-11-06
Payer: COMMERCIAL

## 2018-11-06 ENCOUNTER — HOSPITAL ENCOUNTER (OUTPATIENT)
Facility: HOSPITAL | Age: 63
Setting detail: HOSPITAL OUTPATIENT SURGERY
Discharge: HOME | End: 2018-11-06
Attending: INTERNAL MEDICINE | Admitting: INTERNAL MEDICINE
Payer: COMMERCIAL

## 2018-11-06 VITALS
DIASTOLIC BLOOD PRESSURE: 76 MMHG | OXYGEN SATURATION: 100 % | BODY MASS INDEX: 25.61 KG/M2 | SYSTOLIC BLOOD PRESSURE: 114 MMHG | HEART RATE: 81 BPM | RESPIRATION RATE: 16 BRPM | TEMPERATURE: 99 F | HEIGHT: 68 IN | WEIGHT: 169 LBS

## 2018-11-06 DIAGNOSIS — D13.2 BENIGN NEOPLASM OF DUODENUM: ICD-10-CM

## 2018-11-06 PROCEDURE — 3E0G8GC INTRODUCTION OF OTHER THERAPEUTIC SUBSTANCE INTO UPPER GI, VIA NATURAL OR ARTIFICIAL OPENING ENDOSCOPIC: ICD-10-PCS | Performed by: INTERNAL MEDICINE

## 2018-11-06 RX ORDER — SODIUM CHLORIDE, SODIUM LACTATE, POTASSIUM CHLORIDE, CALCIUM CHLORIDE 600; 310; 30; 20 MG/100ML; MG/100ML; MG/100ML; MG/100ML
INJECTION, SOLUTION INTRAVENOUS CONTINUOUS
Status: DISCONTINUED | OUTPATIENT
Start: 2018-11-06 | End: 2018-11-06

## 2018-11-06 NOTE — ANESTHESIA PREPROCEDURE EVALUATION
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 Pulmonary    Negative pulmonary ROS.                        Neuro/Psych      (+) depression benefits of anesthesia as outlined in the anesthesia consent were reviewed with the patient. The consent was signed without further questions.

## 2018-11-06 NOTE — OPERATIVE REPORT
[facility] Kim[facility] Patient Status:  Hospital Outpatient Surgery    1955 MRN [id number]   [facility] ENDOSCOPY Attending Jonathan Del Cid MD   Hosp Day # 0 PCP Berny Bee MD     PREOPERATIVE DIAGNOSIS/INDICATION: Duodenal adenoma

## 2018-11-06 NOTE — ANESTHESIA POSTPROCEDURE EVALUATION
[facility] Patient Status:  Hospital Outpatient Surgery   Age/Gender 60year old female MRN [id number]   Location [facility]. Attending Quan Villanueva MD   Hosp Day # 0 PCP Unique Alves MD       Anesthesia Post-

## 2018-11-06 NOTE — H&P
Jeremías. Page 82 Patient Status:  Hospital Outpatient Surgery    1955 MRN [id number]   [facility] ENDOSCOPY Attending Pineda Oneil MD   Hosp Day # 0 PCP Judie Oconnor MD     CC: Duodenal adeno COLONOSCOPY  06/01/2005   • CORRECT BUNION,SIMPLE     • ENDOVAS REPAIR, INFRARENL ABDOM AORTIC ANEURYSM/DISSECT      surgery to repair 08/16/2018   • FEMORAL POPLITEAL BYPASS Bilateral 8/16/2018    Performed by Manolo Serrano MD at [facility] CVOR   • HAND/FIN (gastroesophageal reflux disease)     History of colon polyps     Essential hypertension     Vitamin D deficiency     Elevated serum creatinine     Popliteal fullness     Hypoglycemia     Arterial occlusion, lower extremity (HCC)     S/P AAA repair using b

## 2018-11-07 ENCOUNTER — OFFICE VISIT (OUTPATIENT)
Dept: SURGERY | Facility: CLINIC | Age: 63
End: 2018-11-07
Payer: COMMERCIAL

## 2018-11-07 VITALS
HEIGHT: 68 IN | DIASTOLIC BLOOD PRESSURE: 80 MMHG | TEMPERATURE: 98 F | OXYGEN SATURATION: 99 % | RESPIRATION RATE: 16 BRPM | BODY MASS INDEX: 25.34 KG/M2 | HEART RATE: 89 BPM | SYSTOLIC BLOOD PRESSURE: 119 MMHG | WEIGHT: 167.19 LBS

## 2018-11-07 DIAGNOSIS — Z01.818 PRE-OP TESTING: ICD-10-CM

## 2018-11-07 DIAGNOSIS — D13.2 DUODENAL ADENOMA: Primary | ICD-10-CM

## 2018-11-07 PROCEDURE — 99245 OFF/OP CONSLTJ NEW/EST HI 55: CPT | Performed by: SURGERY

## 2018-11-07 NOTE — PATIENT INSTRUCTIONS
Surgery: Partial duodenal resection possible gastric bypass    Date of Surgery:12/6/18    [facility] [place] [address]   Phone: [phone]      · This is an: Inpatient procedure.   · Use the provided Chlorhex are also blood thinners and need to be held at least one week prior to your procedure. It is okay to take Tylenol. · Inform your primary care physician of your surgery and ask if him/her will need to see you prior to surgery.       Pre-Operative Testing  X

## 2018-11-08 ENCOUNTER — TELEPHONE (OUTPATIENT)
Dept: SURGERY | Facility: CLINIC | Age: 63
End: 2018-11-08

## 2018-11-08 DIAGNOSIS — D13.2 DUODENAL ADENOMA: Primary | ICD-10-CM

## 2018-11-08 NOTE — TELEPHONE ENCOUNTER
Pt called to reschedule surgery from 12/6/18 to 12/4/18? Okay per Terrie Kimble. Surgery changed to 12/4/18 at [facility].

## 2018-11-09 ENCOUNTER — LAB ENCOUNTER (OUTPATIENT)
Dept: LAB | Age: 63
End: 2018-11-09
Attending: SURGERY
Payer: COMMERCIAL

## 2018-11-09 DIAGNOSIS — Z01.818 PRE-OP TESTING: ICD-10-CM

## 2018-11-09 DIAGNOSIS — D13.2 DUODENAL ADENOMA: ICD-10-CM

## 2018-11-09 PROCEDURE — 36415 COLL VENOUS BLD VENIPUNCTURE: CPT | Performed by: SURGERY

## 2018-11-09 PROCEDURE — 85025 COMPLETE CBC W/AUTO DIFF WBC: CPT | Performed by: SURGERY

## 2018-11-09 PROCEDURE — 80053 COMPREHEN METABOLIC PANEL: CPT | Performed by: SURGERY

## 2018-11-12 ENCOUNTER — HOSPITAL ENCOUNTER (OUTPATIENT)
Dept: GENERAL RADIOLOGY | Facility: HOSPITAL | Age: 63
Discharge: HOME | End: 2018-11-12
Attending: SURGERY
Payer: COMMERCIAL

## 2018-11-12 DIAGNOSIS — D13.2 DUODENAL ADENOMA: ICD-10-CM

## 2018-11-12 PROCEDURE — 74249 XR UPPER GI AIR CONTRAST+SBS (CPT=74249): CPT | Performed by: SURGERY

## 2018-11-13 ENCOUNTER — PATIENT MESSAGE (OUTPATIENT)
Dept: INTERNAL MEDICINE CLINIC | Facility: CLINIC | Age: 63
End: 2018-11-13

## 2018-11-13 NOTE — H&P (VIEW-ONLY)
[facility] Surgical Oncology    Patient Name:  Melany Cisneros   YOB: 1955   Gender:  Female   Appt Date:  11/7/2018   Provider:  Wally Koenig MD   Insurance:  [employer]...Exactly  Referring Provider: Cathy Mckay MD  Primary C On 11/6/2018 Dr. Geronimo Rivera performed an EGD with submucosal injection. No resection was performed.   Rather a large circumferential predominantly flat laterally spreading non granular tumor, with a smaller sessile component about 4 cm in length at the duode Reviewed:  Past Medical History:   Diagnosis Date   • Abdominal pain 07/17/2018    lower abdominal pain   • Back pain     back surgery in 2008   • Bad breath     have mentioned this to my dentist no results   • Belching 01/01/2015   • Bloating    • Change • Other      lumbar back surgery   • Total abdom hysterectomy        Reviewed Social History:  Social History    Tobacco Use      Smoking status: Never Smoker      Smokeless tobacco: Never Used    Alcohol use: No    Drug use: No     Reviewed:  Family Histo Findings were discussed with the patient at length. The case had been presented at our tumor board and my recommendations reflect those of the tumor board. I recommended surgical resection (partial resection of duodenum).   Patinent has had an upper GI st

## 2018-11-13 NOTE — CONSULTS
[facility] Surgical Oncology    Patient Name:  Farzana Cruz   YOB: 1955   Gender:  Female   Appt Date:  11/7/2018   Provider:  Godfrey Gallagher MD   Insurance:  Shiela Valentin  Referring Provider: Vini Wheeler MD  Primary C On 11/6/2018 Dr. Beatriz Rubio performed an EGD with submucosal injection. No resection was performed.   Rather a large circumferential predominantly flat laterally spreading non granular tumor, with a smaller sessile component about 4 cm in length at the duode Reviewed:  Past Medical History:   Diagnosis Date   • Abdominal pain 07/17/2018    lower abdominal pain   • Back pain     back surgery in 2008   • Bad breath     have mentioned this to my dentist no results   • Belching 01/01/2015   • Bloating    • Change • Other      lumbar back surgery   • Total abdom hysterectomy        Reviewed Social History:  Social History    Tobacco Use      Smoking status: Never Smoker      Smokeless tobacco: Never Used    Alcohol use: No    Drug use: No     Reviewed:  Family Histo Findings were discussed with the patient at length. The case had been presented at our tumor board and my recommendations reflect those of the tumor board. I recommended surgical resection (partial resection of duodenum).   Patinent has had an upper GI st

## 2018-11-13 NOTE — PROGRESS NOTES
Please add pt for 30 min pre op apt with me on Monday 230-3pm, please get a request for pre op from Dr. Leia Taylor and get a copy of his last ov progress note (I can't find it in [address] [place] [address] Rd)

## 2018-11-13 NOTE — TELEPHONE ENCOUNTER
Future Appointments   Date Time Provider Deepti Harvey   11/19/2018  3:00 PM Genesis Partida MD [facility]       Gianna Arias you please get Pre op paper work from Dr. Chinyere Gooden

## 2018-11-13 NOTE — TELEPHONE ENCOUNTER
From: Karla Swift  To: Sana Gonzalez MD  Sent: 11/13/2018 10:06 AM CST  Subject: Other    Dr. Dmitriy Blanton,    I am having a GASTRIC RESECTION performed by Dr. Lakhwinder Raymond on December 4, 2018.      Dr. Merari Pichardo has asked me to schedule an appointment wit

## 2018-11-19 ENCOUNTER — HOSPITAL ENCOUNTER (OUTPATIENT)
Dept: NUCLEAR MEDICINE | Facility: HOSPITAL | Age: 63
Discharge: HOME | End: 2018-11-19
Attending: INTERNAL MEDICINE
Payer: COMMERCIAL

## 2018-11-19 ENCOUNTER — OFFICE VISIT (OUTPATIENT)
Dept: INTERNAL MEDICINE CLINIC | Facility: CLINIC | Age: 63
End: 2018-11-19

## 2018-11-19 VITALS
HEART RATE: 100 BPM | HEIGHT: 68 IN | BODY MASS INDEX: 24.71 KG/M2 | RESPIRATION RATE: 16 BRPM | WEIGHT: 163 LBS | DIASTOLIC BLOOD PRESSURE: 64 MMHG | SYSTOLIC BLOOD PRESSURE: 118 MMHG

## 2018-11-19 DIAGNOSIS — Z01.818 PRE-OP EVALUATION: Primary | ICD-10-CM

## 2018-11-19 DIAGNOSIS — K31.89 RETAINED FOOD IN STOMACH: ICD-10-CM

## 2018-11-19 DIAGNOSIS — I10 ESSENTIAL HYPERTENSION: ICD-10-CM

## 2018-11-19 DIAGNOSIS — N32.81 OAB (OVERACTIVE BLADDER): ICD-10-CM

## 2018-11-19 DIAGNOSIS — K21.9 GASTROESOPHAGEAL REFLUX DISEASE, ESOPHAGITIS PRESENCE NOT SPECIFIED: ICD-10-CM

## 2018-11-19 PROCEDURE — 93000 ELECTROCARDIOGRAM COMPLETE: CPT | Performed by: INTERNAL MEDICINE

## 2018-11-19 PROCEDURE — 78264 GASTRIC EMPTYING IMG STUDY: CPT | Performed by: INTERNAL MEDICINE

## 2018-11-19 PROCEDURE — 99213 OFFICE O/P EST LOW 20 MIN: CPT | Performed by: INTERNAL MEDICINE

## 2018-11-19 RX ORDER — OMEPRAZOLE 20 MG/1
40 CAPSULE, DELAYED RELEASE ORAL DAILY
COMMUNITY
Start: 2017-11-07 | End: 2019-01-11 | Stop reason: ALTCHOICE

## 2018-11-19 NOTE — PROGRESS NOTES
HPI:    Patient ID: Joanna Ventura is a 60year old female.     HPI  Here for pre op risk eval at the request of Dr. Fab Coats prior to small bowel resection on 12/4 for duodenal adenoma, feels well today without other acute complaints  /64 (BP Location: Eyes: Pupils are equal, round, and reactive to light. Neck: Neck supple. Cardiovascular: Normal rate, regular rhythm and normal heart sounds. No murmur heard. Pulmonary/Chest: Effort normal and breath sounds normal. She has no wheezes.    Abdominal

## 2018-11-26 RX ORDER — LORATADINE 10 MG/1
10 TABLET ORAL DAILY
COMMUNITY

## 2018-11-27 NOTE — PROGRESS NOTES
Date: 2018    To: Tim Durbin  : 1955    I hope this letter finds you doing well. I am writing to inform you of the following:        The results of your recent gastric emptying study was normal       Please call the office at [phone]

## 2018-11-28 ENCOUNTER — HOSPITAL ENCOUNTER (OUTPATIENT)
Dept: MAMMOGRAPHY | Age: 63
Discharge: HOME | End: 2018-11-28
Attending: INTERNAL MEDICINE
Payer: COMMERCIAL

## 2018-11-28 DIAGNOSIS — Z12.31 ENCOUNTER FOR SCREENING MAMMOGRAM FOR BREAST CANCER: ICD-10-CM

## 2018-11-28 PROCEDURE — 77067 SCR MAMMO BI INCL CAD: CPT | Performed by: INTERNAL MEDICINE

## 2018-11-30 ENCOUNTER — SOCIAL WORK SERVICES (OUTPATIENT)
Dept: HEMATOLOGY/ONCOLOGY | Facility: HOSPITAL | Age: 63
End: 2018-11-30

## 2018-11-30 NOTE — PROGRESS NOTES
JAHAIRA completed patients Family Medical Leave form and faxed it to Juan Carlos Garcia at fax # [phone] as requested.

## 2018-12-02 VITALS
HEIGHT: 68 IN | BODY MASS INDEX: 28.04 KG/M2 | DIASTOLIC BLOOD PRESSURE: 78 MMHG | SYSTOLIC BLOOD PRESSURE: 118 MMHG | RESPIRATION RATE: 16 BRPM | OXYGEN SATURATION: 98 % | HEART RATE: 88 BPM | WEIGHT: 185 LBS | TEMPERATURE: 98.7 F

## 2018-12-03 ENCOUNTER — HOSPITAL ENCOUNTER (OUTPATIENT)
Dept: MAMMOGRAPHY | Age: 63
Discharge: HOME | End: 2018-12-03
Attending: INTERNAL MEDICINE
Payer: COMMERCIAL

## 2018-12-03 ENCOUNTER — TELEPHONE (OUTPATIENT)
Dept: SURGERY | Facility: CLINIC | Age: 63
End: 2018-12-03

## 2018-12-03 ENCOUNTER — HOSPITAL ENCOUNTER (OUTPATIENT)
Dept: ULTRASOUND IMAGING | Age: 63
Discharge: HOME | End: 2018-12-03
Attending: INTERNAL MEDICINE
Payer: COMMERCIAL

## 2018-12-03 DIAGNOSIS — R92.2 INCONCLUSIVE MAMMOGRAM: ICD-10-CM

## 2018-12-03 PROCEDURE — 77061 BREAST TOMOSYNTHESIS UNI: CPT | Performed by: INTERNAL MEDICINE

## 2018-12-03 PROCEDURE — 77065 DX MAMMO INCL CAD UNI: CPT | Performed by: INTERNAL MEDICINE

## 2018-12-03 PROCEDURE — 76642 ULTRASOUND BREAST LIMITED: CPT | Performed by: INTERNAL MEDICINE

## 2018-12-03 NOTE — TELEPHONE ENCOUNTER
Confirmed surgery date with . RN informed to send post op clinical information and/or request for more certification days to fax [phone].

## 2018-12-04 ENCOUNTER — ANESTHESIA EVENT (OUTPATIENT)
Dept: SURGERY | Facility: HOSPITAL | Age: 63
DRG: 331 | End: 2018-12-04
Payer: COMMERCIAL

## 2018-12-04 ENCOUNTER — HOSPITAL ENCOUNTER (INPATIENT)
Facility: HOSPITAL | Age: 63
LOS: 2 days | Discharge: HOME | DRG: 331 | End: 2018-12-06
Attending: SURGERY | Admitting: SURGERY
Payer: COMMERCIAL

## 2018-12-04 ENCOUNTER — ANESTHESIA (OUTPATIENT)
Dept: SURGERY | Facility: HOSPITAL | Age: 63
DRG: 331 | End: 2018-12-04
Payer: COMMERCIAL

## 2018-12-04 DIAGNOSIS — D13.2 DUODENAL ADENOMA: ICD-10-CM

## 2018-12-04 PROCEDURE — 3E0T3BZ INTRODUCTION OF ANESTHETIC AGENT INTO PERIPHERAL NERVES AND PLEXI, PERCUTANEOUS APPROACH: ICD-10-PCS | Performed by: ANESTHESIOLOGY

## 2018-12-04 PROCEDURE — 0WUF07Z SUPPLEMENT ABDOMINAL WALL WITH AUTOLOGOUS TISSUE SUBSTITUTE, OPEN APPROACH: ICD-10-PCS | Performed by: SURGERY

## 2018-12-04 PROCEDURE — 0DT90ZZ RESECTION OF DUODENUM, OPEN APPROACH: ICD-10-PCS | Performed by: SURGERY

## 2018-12-04 PROCEDURE — 0DNW0ZZ RELEASE PERITONEUM, OPEN APPROACH: ICD-10-PCS | Performed by: SURGERY

## 2018-12-04 PROCEDURE — 99253 IP/OBS CNSLTJ NEW/EST LOW 45: CPT | Performed by: HOSPITALIST

## 2018-12-04 PROCEDURE — 0D190ZA BYPASS DUODENUM TO JEJUNUM, OPEN APPROACH: ICD-10-PCS | Performed by: SURGERY

## 2018-12-04 RX ORDER — HYDROMORPHONE HYDROCHLORIDE 1 MG/ML
0.4 INJECTION, SOLUTION INTRAMUSCULAR; INTRAVENOUS; SUBCUTANEOUS EVERY 5 MIN PRN
Status: DISCONTINUED | OUTPATIENT
Start: 2018-12-04 | End: 2018-12-04 | Stop reason: HOSPADM

## 2018-12-04 RX ORDER — BUPIVACAINE HYDROCHLORIDE 2.5 MG/ML
INJECTION, SOLUTION INFILTRATION; PERINEURAL AS NEEDED
Status: DISCONTINUED | OUTPATIENT
Start: 2018-12-04 | End: 2018-12-04 | Stop reason: HOSPADM

## 2018-12-04 RX ORDER — SODIUM CHLORIDE, SODIUM LACTATE, POTASSIUM CHLORIDE, CALCIUM CHLORIDE 600; 310; 30; 20 MG/100ML; MG/100ML; MG/100ML; MG/100ML
INJECTION, SOLUTION INTRAVENOUS CONTINUOUS
Status: DISCONTINUED | OUTPATIENT
Start: 2018-12-04 | End: 2018-12-05

## 2018-12-04 RX ORDER — HEPARIN SODIUM 5000 [USP'U]/ML
5000 INJECTION, SOLUTION INTRAVENOUS; SUBCUTANEOUS ONCE
Status: COMPLETED | OUTPATIENT
Start: 2018-12-04 | End: 2018-12-04

## 2018-12-04 RX ORDER — ACETAMINOPHEN 500 MG
1000 TABLET ORAL ONCE
Status: DISCONTINUED | OUTPATIENT
Start: 2018-12-04 | End: 2018-12-04 | Stop reason: HOSPADM

## 2018-12-04 RX ORDER — BUPROPION HYDROCHLORIDE 150 MG/1
150 TABLET, EXTENDED RELEASE ORAL DAILY
Status: DISCONTINUED | OUTPATIENT
Start: 2018-12-05 | End: 2018-12-06

## 2018-12-04 RX ORDER — OXYBUTYNIN CHLORIDE 10 MG/1
10 TABLET, EXTENDED RELEASE ORAL DAILY
Status: DISCONTINUED | OUTPATIENT
Start: 2018-12-05 | End: 2018-12-06

## 2018-12-04 RX ORDER — ACETAMINOPHEN 500 MG
1000 TABLET ORAL EVERY 6 HOURS
Status: DISCONTINUED | OUTPATIENT
Start: 2018-12-04 | End: 2018-12-06

## 2018-12-04 RX ORDER — MIDAZOLAM HYDROCHLORIDE 1 MG/ML
1 INJECTION INTRAMUSCULAR; INTRAVENOUS EVERY 5 MIN PRN
Status: DISCONTINUED | OUTPATIENT
Start: 2018-12-04 | End: 2018-12-04 | Stop reason: HOSPADM

## 2018-12-04 RX ORDER — LISINOPRIL 20 MG/1
20 TABLET ORAL
Status: DISCONTINUED | OUTPATIENT
Start: 2018-12-05 | End: 2018-12-06

## 2018-12-04 RX ORDER — FLUTICASONE PROPIONATE 50 MCG
2 SPRAY, SUSPENSION (ML) NASAL DAILY
Status: DISCONTINUED | OUTPATIENT
Start: 2018-12-05 | End: 2018-12-06

## 2018-12-04 RX ORDER — HYDROCODONE BITARTRATE AND ACETAMINOPHEN 5; 325 MG/1; MG/1
1 TABLET ORAL AS NEEDED
Status: DISCONTINUED | OUTPATIENT
Start: 2018-12-04 | End: 2018-12-04 | Stop reason: HOSPADM

## 2018-12-04 RX ORDER — LABETALOL HYDROCHLORIDE 5 MG/ML
INJECTION, SOLUTION INTRAVENOUS
Status: DISCONTINUED
Start: 2018-12-04 | End: 2018-12-04 | Stop reason: WASHOUT

## 2018-12-04 RX ORDER — LABETALOL HYDROCHLORIDE 5 MG/ML
5 INJECTION, SOLUTION INTRAVENOUS EVERY 5 MIN PRN
Status: DISCONTINUED | OUTPATIENT
Start: 2018-12-04 | End: 2018-12-04 | Stop reason: HOSPADM

## 2018-12-04 RX ORDER — HYDROCODONE BITARTRATE AND ACETAMINOPHEN 5; 325 MG/1; MG/1
2 TABLET ORAL AS NEEDED
Status: DISCONTINUED | OUTPATIENT
Start: 2018-12-04 | End: 2018-12-04 | Stop reason: HOSPADM

## 2018-12-04 RX ORDER — FAMOTIDINE 20 MG/1
20 TABLET ORAL 2 TIMES DAILY
Status: DISCONTINUED | OUTPATIENT
Start: 2018-12-04 | End: 2018-12-05

## 2018-12-04 RX ORDER — NALOXONE HYDROCHLORIDE 0.4 MG/ML
80 INJECTION, SOLUTION INTRAMUSCULAR; INTRAVENOUS; SUBCUTANEOUS AS NEEDED
Status: DISCONTINUED | OUTPATIENT
Start: 2018-12-04 | End: 2018-12-04 | Stop reason: HOSPADM

## 2018-12-04 RX ORDER — SODIUM CHLORIDE, SODIUM LACTATE, POTASSIUM CHLORIDE, CALCIUM CHLORIDE 600; 310; 30; 20 MG/100ML; MG/100ML; MG/100ML; MG/100ML
INJECTION, SOLUTION INTRAVENOUS CONTINUOUS
Status: DISCONTINUED | OUTPATIENT
Start: 2018-12-04 | End: 2018-12-06

## 2018-12-04 RX ORDER — ENOXAPARIN SODIUM 100 MG/ML
40 INJECTION SUBCUTANEOUS DAILY
Status: DISCONTINUED | OUTPATIENT
Start: 2018-12-05 | End: 2018-12-06

## 2018-12-04 RX ORDER — MORPHINE SULFATE 4 MG/ML
2 INJECTION, SOLUTION INTRAMUSCULAR; INTRAVENOUS EVERY 5 MIN PRN
Status: DISCONTINUED | OUTPATIENT
Start: 2018-12-04 | End: 2018-12-04 | Stop reason: HOSPADM

## 2018-12-04 RX ORDER — ACETAMINOPHEN 500 MG
1000 TABLET ORAL ONCE
Status: ON HOLD | COMMUNITY
End: 2018-12-06

## 2018-12-04 RX ORDER — ONDANSETRON 2 MG/ML
4 INJECTION INTRAMUSCULAR; INTRAVENOUS AS NEEDED
Status: DISCONTINUED | OUTPATIENT
Start: 2018-12-04 | End: 2018-12-04 | Stop reason: HOSPADM

## 2018-12-04 RX ORDER — ONDANSETRON 2 MG/ML
4 INJECTION INTRAMUSCULAR; INTRAVENOUS EVERY 6 HOURS PRN
Status: DISCONTINUED | OUTPATIENT
Start: 2018-12-04 | End: 2018-12-06

## 2018-12-04 RX ORDER — MEPERIDINE HYDROCHLORIDE 25 MG/ML
12.5 INJECTION INTRAMUSCULAR; INTRAVENOUS; SUBCUTANEOUS AS NEEDED
Status: COMPLETED | OUTPATIENT
Start: 2018-12-04 | End: 2018-12-04

## 2018-12-04 RX ORDER — MEPERIDINE HYDROCHLORIDE 25 MG/ML
INJECTION INTRAMUSCULAR; INTRAVENOUS; SUBCUTANEOUS
Status: COMPLETED
Start: 2018-12-04 | End: 2018-12-04

## 2018-12-04 RX ORDER — MAGNESIUM HYDROXIDE 1200 MG/15ML
LIQUID ORAL CONTINUOUS PRN
Status: COMPLETED | OUTPATIENT
Start: 2018-12-04 | End: 2018-12-04

## 2018-12-04 RX ORDER — FAMOTIDINE 10 MG/ML
20 INJECTION, SOLUTION INTRAVENOUS 2 TIMES DAILY
Status: DISCONTINUED | OUTPATIENT
Start: 2018-12-04 | End: 2018-12-05

## 2018-12-04 RX ORDER — METOCLOPRAMIDE HYDROCHLORIDE 5 MG/ML
10 INJECTION INTRAMUSCULAR; INTRAVENOUS EVERY 6 HOURS PRN
Status: DISCONTINUED | OUTPATIENT
Start: 2018-12-04 | End: 2018-12-06

## 2018-12-04 RX ORDER — SODIUM CHLORIDE, SODIUM LACTATE, POTASSIUM CHLORIDE, CALCIUM CHLORIDE 600; 310; 30; 20 MG/100ML; MG/100ML; MG/100ML; MG/100ML
INJECTION, SOLUTION INTRAVENOUS CONTINUOUS
Status: DISCONTINUED | OUTPATIENT
Start: 2018-12-04 | End: 2018-12-04 | Stop reason: HOSPADM

## 2018-12-04 RX ORDER — BUPROPION HYDROCHLORIDE 150 MG/1
300 TABLET, EXTENDED RELEASE ORAL DAILY
Status: DISCONTINUED | OUTPATIENT
Start: 2018-12-05 | End: 2018-12-06

## 2018-12-04 RX ORDER — HYDROMORPHONE HYDROCHLORIDE 1 MG/ML
INJECTION, SOLUTION INTRAMUSCULAR; INTRAVENOUS; SUBCUTANEOUS
Status: COMPLETED
Start: 2018-12-04 | End: 2018-12-04

## 2018-12-04 NOTE — INTERVAL H&P NOTE
There has been no significant change since the patient was seen as documented in EPIC. Surgery revisted. To proceed as planned.       Chon GIBSON PA-C

## 2018-12-04 NOTE — BRIEF OP NOTE
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

## 2018-12-04 NOTE — ANESTHESIA PREPROCEDURE EVALUATION
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 Specifically, the common intrinsic risks of a general anesthetic were discussed including reasonable postoperative pain expectations for the procedure, nausea/vomiting, dental damage, sore throat and adverse reactions related to medications administe

## 2018-12-04 NOTE — ANESTHESIA POSTPROCEDURE EVALUATION
[facility] Patient Status:  Surgery Admit   Age/Gender 58year old female MRN [id number]   Location [address] Attending Charlotte Kothari MD   Hosp Day # 0 PCP Evelyn Donaldson MD       Anesthesia Post-op

## 2018-12-05 PROCEDURE — 99232 SBSQ HOSP IP/OBS MODERATE 35: CPT | Performed by: HOSPITALIST

## 2018-12-05 RX ORDER — OXYCODONE HYDROCHLORIDE 5 MG/1
5 TABLET ORAL EVERY 4 HOURS PRN
Status: DISCONTINUED | OUTPATIENT
Start: 2018-12-05 | End: 2018-12-06

## 2018-12-05 RX ORDER — FAMOTIDINE 10 MG/ML
20 INJECTION, SOLUTION INTRAVENOUS DAILY
Status: DISCONTINUED | OUTPATIENT
Start: 2018-12-06 | End: 2018-12-06

## 2018-12-05 RX ORDER — FAMOTIDINE 20 MG/1
20 TABLET ORAL DAILY
Status: DISCONTINUED | OUTPATIENT
Start: 2018-12-06 | End: 2018-12-06

## 2018-12-05 NOTE — PROGRESS NOTES
POD #1 s/p duodenal resection    Doing well this AM  Pain controlled with PCA  Tolerating clear liquids  Good urinary output     /84 (BP Location: Right arm)   Pulse 88   Temp 98.4 °F (36.9 °C) (Oral)   Resp 18   Ht 1.727 m (5' 8\")   Wt 78.9 kg (174

## 2018-12-05 NOTE — CM/SW NOTE
Patient was screened during rounds and no needs are identified at this time. RN to contact SW/CM if needs arise.     Cherise Singletary RN, CCM    [phone] pgr: [phone]

## 2018-12-05 NOTE — PROGRESS NOTES
[facility] Pharmacy Note:  Renal Dose Adjustment    Jacquelyn Mendiolatrevavane has been prescribed famotidine (PEPCID) 20 mg intravenously Elridge Pomona every 12 hours. Estimated Creatinine Clearance: 48.6 mL/min (A) (based on SCr of 1.18 mg/dL (H)).     Her calculated creatini

## 2018-12-05 NOTE — PHYSICAL THERAPY NOTE
PHYSICAL THERAPY EVALUATION - INPATIENT     Room Number: 1202/3385-Z  Evaluation Date: 12/5/2018  Type of Evaluation: Initial  Physician Order: PT Eval and Treat    Presenting Problem: duodenal adenoma s/p duodenal resection  Reason for Therapy: Delilah sweats     for years.    • Overactive bladder    • Pain in joints    • Pain with bowel movements 07/12/2018    have to strain sometimes   • Popliteal fullness 3/16/2017   • S/P AAA repair using bifurcation graft 8/27/2018   • Sleep disturbance    • Stress home    OBJECTIVE     Fall Risk: Standard fall risk    WEIGHT BEARING RESTRICTION  Weight Bearing Restriction: None                PAIN ASSESSMENT  Ratin  Location: Incision site  Management Techniques:  Activity promotion;Breathing techniques;Relaxatio Contact guard assist  Distance (ft): 200  Assistive Device: Rolling walker;None(75 ft RW, 125 ft no AD)  Pattern: (dec omkar)  Stoop/Curb Assistance: Not tested       Skilled Therapy Provided: Pt seated in bedside recliner upon PT arrival and agreeable t low.  These impairments and comorbidities manifest themselves as functional limitations in independent bed mobility, transfers, and gait.   The patient is below baseline and would benefit from skilled inpatient PT to address the above deficits to assist pat

## 2018-12-05 NOTE — CONSULTS
[facility]  CONSULT     Jeri Lopez Patient Status:  Inpatient    1955 MRN [id number]   [facility] 7NE-A Attending Marley Parra MD   Hosp Day # 0 PCP Abigail Roberts MD     Reason for consult: med mgmt    Requested by: S/P AAA repair using bifurcation graft 8/27/2018   • Sleep disturbance    • Stress    • Unspecified essential hypertension    • Vomiting 07/18/2018    sometimes   • Weight gain     weight has gone up and down for years.         Past Surgical History:   Past by mouth daily. Disp: 90 tablet Rfl: 3   BuPROPion HCl ER, XL, 300 MG Oral Tablet 24 Hr Take 1 tablet (300 mg total) by mouth daily. Disp: 90 tablet Rfl: 3   hydrochlorothiazide 12.5 MG Oral Tab Take 1 tablet (12.5 mg total) by mouth daily.  Disp: 90 tablet hours. No results for input(s): TROP, CK in the last 168 hours. Imaging: Imaging data reviewed in Epic. ASSESSMENT / PLAN:     1. Duodenal adenoma s/p HIPEC  1. Pain control  2. Labs in AM  3. On CLD  2. HTN  1.  Mildly hypertensive now, possibly

## 2018-12-05 NOTE — PROGRESS NOTES
[facility]  Progress Note     Pedro Hitchcock Patient Status:  Inpatient    1955 MRN [id number]   [facility] 7NE-A Attending Hazel Rossi MD   Hosp Day # 1 PCP Bry Jeffrey MD     Chief Complaint: Post-op    S: Patient ad duodenal resection with duodenojejunostomy and JENNA 12/4  2. GERD  1. Diet per surgery  2. IVF  3. PCA - suspect could DC given minimal use  4. H2B  5. Antiemetics as needed  6. Remove lim  7. Increase activity  8. Incentive spirometry  9.  Surgery followi

## 2018-12-05 NOTE — PLAN OF CARE
NURSING ADMISSION NOTE    Pt admitted from [facility] at 0700, S/P HIPEC. Patient admitted via Cart. Oriented to room. Safety precautions initiated. Bed in low position. Call light in reach. Admission navigator completed. Pt drowsy, easily awaken.  A

## 2018-12-05 NOTE — OPERATIVE REPORT
[facility]    PATIENT'S NAME: Kay OVALLE   ATTENDING PHYSICIAN: Sonya Letters I. Zackary Cooks, MD   OPERATING PHYSICIAN: Rex Rodriguez.  Zackary Cooks, MD   PATIENT ACCOUNT#:   [id number]    LOCATION:  [address] [place] [address]  MEDICAL RECORD #:   [id number]       DATE OF BIRTH:  [birth year] third portion. Per GI colleagues, the tattoo was proximal to the adenoma itself. The retroperitoneal duodenal mobilization with Kocher maneuver also rendered this small bowel resection a difficult task.   The ligament of Treitz was then identified from th 08:33:51  [facility] [id number]  [facility]/

## 2018-12-06 VITALS
BODY MASS INDEX: 26.32 KG/M2 | WEIGHT: 173.69 LBS | OXYGEN SATURATION: 98 % | HEART RATE: 93 BPM | HEIGHT: 68 IN | TEMPERATURE: 99 F | RESPIRATION RATE: 15 BRPM | DIASTOLIC BLOOD PRESSURE: 84 MMHG | SYSTOLIC BLOOD PRESSURE: 139 MMHG

## 2018-12-06 PROCEDURE — 99231 SBSQ HOSP IP/OBS SF/LOW 25: CPT | Performed by: HOSPITALIST

## 2018-12-06 RX ORDER — ACETAMINOPHEN 500 MG
1000 TABLET ORAL EVERY 6 HOURS PRN
Qty: 30 TABLET | Refills: 0 | Status: SHIPPED | OUTPATIENT
Start: 2018-12-06

## 2018-12-06 RX ORDER — OXYCODONE HYDROCHLORIDE 5 MG/1
5 TABLET ORAL EVERY 4 HOURS PRN
Qty: 40 TABLET | Refills: 0 | Status: SHIPPED | OUTPATIENT
Start: 2018-12-06 | End: 2019-08-23 | Stop reason: ALTCHOICE

## 2018-12-06 NOTE — PHYSICAL THERAPY NOTE
PHYSICAL THERAPY TREATMENT NOTE - INPATIENT    Room Number: 9428/3591-S     Session: 1   Number of Visits to Meet Established Goals: 3    Presenting Problem: duodenal adenoma s/p duodenal resection     History related to current admission:   Pt is 58 year 07/12/2018    have to strain sometimes   • Popliteal fullness 3/16/2017   • S/P AAA repair using bifurcation graft 8/27/2018   • Sleep disturbance    • Stress    • Unspecified essential hypertension    • Vomiting 07/18/2018    sometimes   • Weight gain O2 WALK                    AM-PAC '6-Clicks' INPATIENT SHORT FORM - BASIC MOBILITY  How much difficulty does the patient currently have. ..  -   Turning over in bed (including adjusting bedclothes, sheets and blankets)?: None   -   Sitting down on and s education; Family education;Gait training;Stair training;Transfer training;Balance training  Rehab Potential : Good  Frequency (Obs): 5x/week    URRENT GOALS     Goal #1 Patient is able to demonstrate supine - sit EOB @ level: independent      Goal #2 Rolf

## 2018-12-06 NOTE — PLAN OF CARE
Ambulated halls twice for long distances. Sat up in the chair twice. Tolerating Oxycodone and full liquids. No flatus. Voiding adequately on own after lim removal. Midline incision dressing changed per surgery today. No complications.  Dressing C/D/I. IS=

## 2018-12-06 NOTE — PLAN OF CARE
Assumed care at [facility]. Pt a/ox4. VSS, nsr per tele, RA. Pain controlled w/ pca dilaudid and scheduled tylenol po. Mid abd incision w/ telfa drsg, C/D/I.  +BS, denies flatus. Ambulates well. Voids. Pt updated w/ POC. Call light in reach.   PT to see pt

## 2018-12-06 NOTE — PLAN OF CARE
Assumed patient care at 0730. Vital signs stable.    PCA discontinued   Diet advanced to soft, tolerating well   Ambulating halls   Anticipated DC today, will continue to monitor       CARDIOVASCULAR - ADULT    • Maintains optimal cardiac output and hemodyn

## 2018-12-06 NOTE — PROGRESS NOTES
POD #2 s/p duodenal resection    Doing well this AM. Afebrile and hemodynamically stable.   Pain controlled with PO pain medication  Tolerating full liquids  Good urinary output   Denies fever, chills, or N/V    /78 (BP Location: Right arm)   Pulse 94

## 2018-12-06 NOTE — DIETARY NOTE
Nutrition Short Note    Pt seen for low fiber diet education.  Appropriate education and handout provided, pt reports she is very familiar with diet. Pt reports appetite good, tolerating diet. Would like an ensure, RD ordered.  All questions answered and RD

## 2018-12-06 NOTE — PROGRESS NOTES
[facility]  Progress Note     Alyssa Angulo Patient Status:  Inpatient    1955 MRN [id number]   [facility] 7NE-A Attending Estephania Montalvo MD   Hosp Day # 2 PCP Shayna Maria MD     Chief Complaint: Post-op    S: Patient do Oral Daily   • BuPROPion HCl ER (SR)  150 mg Oral Daily       ASSESSMENT / PLAN:     1. Duodenal adenoma sp duodenal resection with duodenojejunostomy and JENNA 12/4  2. GERD  1. Per surgery  3. Transaminitis, improving  1. Monitor  4.  Hyperglycemia, A1c 5.4

## 2018-12-06 NOTE — PROGRESS NOTES
Okay for discharge   Medication and discharge instructions reviewed   IV removed, tele removed   Extra supplies for incision care provided   Waiting for wheelchair for transport

## 2018-12-07 ENCOUNTER — TELEPHONE (OUTPATIENT)
Dept: SURGERY | Facility: CLINIC | Age: 63
End: 2018-12-07

## 2018-12-07 ENCOUNTER — TELEPHONE (OUTPATIENT)
Dept: INTERNAL MEDICINE CLINIC | Facility: CLINIC | Age: 63
End: 2018-12-07

## 2018-12-07 ENCOUNTER — PATIENT OUTREACH (OUTPATIENT)
Dept: CASE MANAGEMENT | Age: 63
End: 2018-12-07

## 2018-12-07 DIAGNOSIS — Z02.9 ENCOUNTERS FOR ADMINISTRATIVE PURPOSE: ICD-10-CM

## 2018-12-07 DIAGNOSIS — D13.2 DUODENAL ADENOMA: ICD-10-CM

## 2018-12-07 PROCEDURE — 1111F DSCHRG MED/CURRENT MED MERGE: CPT

## 2018-12-07 NOTE — TELEPHONE ENCOUNTER
Patient discharged home from [facility] on 12/6/18 and recommended to have a TCM HFU by 12/20/18. NCM attempted to schedule TCM HFU, patient will call after she gets her husbands work schedule to schedule an appointment.      Triage: Please call pascual

## 2018-12-07 NOTE — TELEPHONE ENCOUNTER
LVMM to follow up on how patient is doing post discharge. Request call back. Confirmed post op visit next week. Direct call back number and answering service number left on message.    Future Appointments   Date Time Provider Deepti Harvey   12/11/2018

## 2018-12-07 NOTE — DISCHARGE SUMMARY
[facility]  Discharge Summary    Farzana Cruz Patient Status:  Inpatient    1955 MRN [id number]   [facility] 7NE-A Attending No att. providers found   Hosp Day # 2 PCP Markel Urias MD     Date of Admission: 2018    Bear with submucosal injection. No resection was performed. Rather a large circumferential predominantly flat laterally spreading non granular tumor, with a smaller sessile component about 4 cm in length at the duodenum 3rd portion distal to mayor ampulla. Hr  Take 1 capsule (4 mg total) by mouth once daily. , Normal, Disp-90 capsule, R-3    Fluticasone Propionate (FLONASE) 50 MCG/ACT Nasal Suspension  2 sprays by Each Nare route daily. , Normal, Disp-1 Bottle, R-2    !! - Potential duplicate medications found

## 2018-12-07 NOTE — TELEPHONE ENCOUNTER
[phone]   for pt ([id number] Ronda Garay per HIPAA) to call back at the office to schedule HFU or if any further questions.

## 2018-12-07 NOTE — PROGRESS NOTES
Initial Post Discharge Follow Up   Discharge Date: 12/6/18  Contact Date: 12/7/2018    Consent Verification:  Assessment Completed With: Patient  HIPAA Verified?   Yes    Discharge Dx:    Duodenal adenoma: S/P Duodenal resection with duodenojejunostomy, MCG/ACT Nasal Suspension 2 sprays by Each Nare route daily. Disp: 1 Bottle Rfl: 2   loratadine 10 MG Oral Tab Take 10 mg by mouth daily. Disp:  Rfl:    omeprazole 20 MG Oral Capsule Delayed Release Take 20 mg by mouth daily.  Disp:  Rfl:    BuPROPion HCl ER Your appointments     Date & Time Appointment Department [facility])    Dec 11, 2018  7:00 AM CST [facility])    Dec 12, 2018 12:30 PM CST Exam - Ne discussed. Any changes or updates to medications and or orders sent to PCP.

## 2018-12-09 ENCOUNTER — TELEPHONE (OUTPATIENT)
Dept: SURGERY | Facility: CLINIC | Age: 63
End: 2018-12-09

## 2018-12-09 NOTE — TELEPHONE ENCOUNTER
Patient paged me this am with bilious vomiting. She did feel better afterwards. I asked her if symptoms recur to call me. We will have her come see us tomorrow otherwise.

## 2018-12-10 ENCOUNTER — TELEPHONE (OUTPATIENT)
Dept: INTERNAL MEDICINE CLINIC | Facility: CLINIC | Age: 63
End: 2018-12-10

## 2018-12-10 ENCOUNTER — TELEPHONE (OUTPATIENT)
Dept: SURGERY | Facility: CLINIC | Age: 63
End: 2018-12-10

## 2018-12-10 ENCOUNTER — HOSPITAL ENCOUNTER (INPATIENT)
Facility: HOSPITAL | Age: 63
LOS: 3 days | Discharge: HOME | DRG: 394 | End: 2018-12-13
Attending: SURGERY | Admitting: SURGERY
Payer: COMMERCIAL

## 2018-12-10 ENCOUNTER — OFFICE VISIT (OUTPATIENT)
Dept: SURGERY | Facility: CLINIC | Age: 63
End: 2018-12-10
Payer: COMMERCIAL

## 2018-12-10 VITALS
DIASTOLIC BLOOD PRESSURE: 88 MMHG | OXYGEN SATURATION: 99 % | BODY MASS INDEX: 24.46 KG/M2 | WEIGHT: 161.38 LBS | TEMPERATURE: 99 F | HEIGHT: 68 IN | RESPIRATION RATE: 18 BRPM | SYSTOLIC BLOOD PRESSURE: 141 MMHG | HEART RATE: 104 BPM

## 2018-12-10 DIAGNOSIS — D13.2 DUODENAL ADENOMA: Primary | ICD-10-CM

## 2018-12-10 PROCEDURE — 99024 POSTOP FOLLOW-UP VISIT: CPT | Performed by: PHYSICIAN ASSISTANT

## 2018-12-10 PROCEDURE — 99223 1ST HOSP IP/OBS HIGH 75: CPT | Performed by: HOSPITALIST

## 2018-12-10 RX ORDER — ONDANSETRON 4 MG/1
4 TABLET, FILM COATED ORAL EVERY 8 HOURS PRN
Status: DISCONTINUED | OUTPATIENT
Start: 2018-12-10 | End: 2018-12-11

## 2018-12-10 RX ORDER — DEXTROSE, SODIUM CHLORIDE, AND POTASSIUM CHLORIDE 5; .45; .15 G/100ML; G/100ML; G/100ML
INJECTION INTRAVENOUS CONTINUOUS
Status: DISCONTINUED | OUTPATIENT
Start: 2018-12-10 | End: 2018-12-13

## 2018-12-10 RX ORDER — LORAZEPAM 2 MG/ML
0.5 INJECTION INTRAMUSCULAR ONCE
Status: COMPLETED | OUTPATIENT
Start: 2018-12-10 | End: 2018-12-10

## 2018-12-10 RX ORDER — FLUTICASONE PROPIONATE 50 MCG
2 SPRAY, SUSPENSION (ML) NASAL DAILY
Status: DISCONTINUED | OUTPATIENT
Start: 2018-12-10 | End: 2018-12-13

## 2018-12-10 RX ORDER — HYDROMORPHONE HYDROCHLORIDE 1 MG/ML
1 INJECTION, SOLUTION INTRAMUSCULAR; INTRAVENOUS; SUBCUTANEOUS EVERY 2 HOUR PRN
Status: DISCONTINUED | OUTPATIENT
Start: 2018-12-10 | End: 2018-12-13

## 2018-12-10 RX ORDER — ONDANSETRON 2 MG/ML
4 INJECTION INTRAMUSCULAR; INTRAVENOUS EVERY 6 HOURS PRN
Status: DISCONTINUED | OUTPATIENT
Start: 2018-12-10 | End: 2018-12-11

## 2018-12-10 RX ORDER — OXYCODONE HYDROCHLORIDE 5 MG/1
5 TABLET ORAL EVERY 4 HOURS PRN
Status: DISCONTINUED | OUTPATIENT
Start: 2018-12-10 | End: 2018-12-13

## 2018-12-10 RX ORDER — ENOXAPARIN SODIUM 100 MG/ML
40 INJECTION SUBCUTANEOUS DAILY
Status: DISCONTINUED | OUTPATIENT
Start: 2018-12-11 | End: 2018-12-13

## 2018-12-10 RX ORDER — ENOXAPARIN SODIUM 100 MG/ML
40 INJECTION SUBCUTANEOUS ONCE
Status: COMPLETED | OUTPATIENT
Start: 2018-12-10 | End: 2018-12-10

## 2018-12-10 RX ORDER — OXYBUTYNIN CHLORIDE 10 MG/1
10 TABLET, EXTENDED RELEASE ORAL DAILY
Status: DISCONTINUED | OUTPATIENT
Start: 2018-12-10 | End: 2018-12-12

## 2018-12-10 RX ORDER — PANTOPRAZOLE SODIUM 40 MG/1
40 TABLET, DELAYED RELEASE ORAL EVERY 24 HOURS
Status: DISCONTINUED | OUTPATIENT
Start: 2018-12-10 | End: 2018-12-13

## 2018-12-10 RX ORDER — HYDROMORPHONE HYDROCHLORIDE 1 MG/ML
0.5 INJECTION, SOLUTION INTRAMUSCULAR; INTRAVENOUS; SUBCUTANEOUS EVERY 2 HOUR PRN
Status: DISCONTINUED | OUTPATIENT
Start: 2018-12-10 | End: 2018-12-13

## 2018-12-10 RX ORDER — BUPROPION HYDROCHLORIDE 100 MG/1
200 TABLET, EXTENDED RELEASE ORAL
Status: DISCONTINUED | OUTPATIENT
Start: 2018-12-11 | End: 2018-12-13

## 2018-12-10 NOTE — TELEPHONE ENCOUNTER
MILTONMM following up with pt. Informed pt that per Dr. Ledesma Roles he would like for her to be seen in the office today with Olivier Brunson PA-C instead of Wednesday. Call back number given to reschedule appointment.

## 2018-12-10 NOTE — CONSULTS
[facility]  CONSULT     Brittney Leal Patient Status:  Observation    1955 MRN [id number]   [facility] 7NE-A Attending Chadd Acosta MD   Hosp Day # 0 PCP Janyce Krabbe, MD     Reason for consult: Medical management    R Pain in joints    • Pain with bowel movements 07/12/2018    have to strain sometimes   • Popliteal fullness 3/16/2017   • S/P AAA repair using bifurcation graft 8/27/2018   • Sleep disturbance    • Stress    • Unspecified essential hypertension    • [facility] facility-administered medications on file prior to encounter. Current Outpatient Medications on File Prior to Encounter:  acetaminophen 500 MG Oral Tab Take 2 tablets (1,000 mg total) by mouth every 6 (six) hours as needed for Pain.  Disp: 30 tablet Rfl: tenderness, dressing in place  Neurologic: No focal neurological deficits. CNII-XII grossly intact. Musculoskeletal: Moves all extremities. Extremities: No edema or cyanosis. Integument: No rashes or lesions. Psychiatric: Appropriate mood and affect.

## 2018-12-10 NOTE — TELEPHONE ENCOUNTER
Tried to renew PA or Alicia Johns but it is too soon. Approved 7/16/18-12/13/18  This drug is too soon to refill.  Please advise the pharmacy to either resubmit on the next fill date or contact the [address] at [phone] for assistance

## 2018-12-10 NOTE — PLAN OF CARE
NURSING ADMISSION NOTE      Patient admitted via Wheelchair  Oriented to room. Safety precautions initiated. Bed in low position. Call light in reach.     Patient direct admit from Dr. Noel Conroy office  A&Ox4  NSR on tele  C/o some stomach upset and in

## 2018-12-10 NOTE — H&P
[facility]    Patient Name:  Bela Rios   YOB: 1955   Gender:  Female   Appt Date:  12/10/2018   Provider:  No name on file.    Insurance:  Marilou Melendez MD    Phone was unremarkable. She was ambulating, tolerating a low fiber diet, and pain was controlled PO pain medication. She was discharged home on POD #2. The patient states this weekend her abdominal pain has gotten less manageable.  She has had 4 episodes of b using bifurcation graft 8/27/2018   • Sleep disturbance    • Stress    • Unspecified essential hypertension    • Vomiting 07/18/2018    sometimes   • Weight gain     weight has gone up and down for years.       Reviewed:  Past Surgical History:   Procedure serosanguinous drainage from abdominal incision. Musculoskeletal: Extremities: no edema. Skin: Inspection and palpation: no jaundice.       Document Review:  Surgical pathology pending     Procedure(s):  A few staples were removed from inferior portion o

## 2018-12-11 ENCOUNTER — APPOINTMENT (OUTPATIENT)
Dept: NUCLEAR MEDICINE | Facility: HOSPITAL | Age: 63
DRG: 394 | End: 2018-12-11
Attending: PHYSICIAN ASSISTANT
Payer: COMMERCIAL

## 2018-12-11 PROCEDURE — 78264 GASTRIC EMPTYING IMG STUDY: CPT | Performed by: PHYSICIAN ASSISTANT

## 2018-12-11 PROCEDURE — 99232 SBSQ HOSP IP/OBS MODERATE 35: CPT | Performed by: HOSPITALIST

## 2018-12-11 RX ORDER — LORAZEPAM 0.5 MG/1
0.5 TABLET ORAL NIGHTLY PRN
Status: DISCONTINUED | OUTPATIENT
Start: 2018-12-11 | End: 2018-12-13

## 2018-12-11 RX ORDER — ONDANSETRON 2 MG/ML
4 INJECTION INTRAMUSCULAR; INTRAVENOUS EVERY 4 HOURS PRN
Status: DISCONTINUED | OUTPATIENT
Start: 2018-12-11 | End: 2018-12-13

## 2018-12-11 RX ORDER — METOCLOPRAMIDE HYDROCHLORIDE 5 MG/ML
5 INJECTION INTRAMUSCULAR; INTRAVENOUS EVERY 6 HOURS PRN
Status: DISCONTINUED | OUTPATIENT
Start: 2018-12-11 | End: 2018-12-12

## 2018-12-11 RX ORDER — ACETAMINOPHEN 325 MG/1
650 TABLET ORAL EVERY 6 HOURS PRN
Status: DISCONTINUED | OUTPATIENT
Start: 2018-12-11 | End: 2018-12-13

## 2018-12-11 RX ORDER — ACETAMINOPHEN 650 MG/1
650 SUPPOSITORY RECTAL EVERY 6 HOURS PRN
Status: DISCONTINUED | OUTPATIENT
Start: 2018-12-11 | End: 2018-12-13

## 2018-12-11 RX ORDER — ONDANSETRON 4 MG/1
4 TABLET, FILM COATED ORAL EVERY 4 HOURS PRN
Status: DISCONTINUED | OUTPATIENT
Start: 2018-12-11 | End: 2018-12-13

## 2018-12-11 NOTE — PLAN OF CARE
Assumed care at 0700. No acute issues today. C/o abdominal \"tenderness\", declined WA tylenol.    ML staples intact, dressing changed per Reny Hernandes given x 2 for c/o nausea and feeling of \"fullness\"  NPO for gastric emptying scan, NPO again a

## 2018-12-11 NOTE — PLAN OF CARE
GASTROINTESTINAL - ADULT    • Minimal or absence of nausea and vomiting Progressing        Patient/Family Goals    • Patient/Family Long Term Goal Progressing    • Patient/Family Short Term Goal Progressing          Patient is alert and oriented.   Received

## 2018-12-11 NOTE — PROGRESS NOTES
[facility]ITAL[facility]  Progress Note     Mckenzie Kimble Patient Status:  Inpatient    1955 MRN [id number]   [facility] 7NE-A Attending Amy Rosado MD   Hosp Day # 1 PCP Berny Bee MD     Chief Complaint: nausea     S: Patient fe Or   • Pantoprazole Sodium  40 mg Oral Q24H   • BuPROPion HCl ER (SR)  200 mg Oral 2 times per day   • Fluticasone Propionate  2 spray Each Nare Daily   • Oxybutynin Chloride ER  10 mg Oral Daily   • enoxaparin  40 mg Subcutaneous Daily       ASSESSMENT

## 2018-12-11 NOTE — PROGRESS NOTES
Hospital Day #2    The patient feels better this AM  No fevers or chills  No further episodes of emesis. C/o nausea requiring Zofran. Would like to try some juice. Abdominal pain improved. Did not require pain medication over night.     /80 (BP Loca

## 2018-12-11 NOTE — CM/SW NOTE
Patient was screened during rounds and no needs are identified at this time. RN to contact SW/CM if needs arise.     Nargis Alcantar RN, [facility]    [phone] pgr: [phone]

## 2018-12-11 NOTE — VASCULAR ACCESS
Vascular access RN spoke with patient's RN whom stated they no longer needed a consult. Order completed.

## 2018-12-12 ENCOUNTER — APPOINTMENT (OUTPATIENT)
Dept: GENERAL RADIOLOGY | Facility: HOSPITAL | Age: 63
DRG: 394 | End: 2018-12-12
Attending: PHYSICIAN ASSISTANT
Payer: COMMERCIAL

## 2018-12-12 ENCOUNTER — APPOINTMENT (OUTPATIENT)
Dept: CT IMAGING | Facility: HOSPITAL | Age: 63
DRG: 394 | End: 2018-12-12
Attending: PHYSICIAN ASSISTANT
Payer: COMMERCIAL

## 2018-12-12 PROCEDURE — 74176 CT ABD & PELVIS W/O CONTRAST: CPT | Performed by: PHYSICIAN ASSISTANT

## 2018-12-12 PROCEDURE — 74240 X-RAY XM UPR GI TRC 1CNTRST: CPT | Performed by: PHYSICIAN ASSISTANT

## 2018-12-12 PROCEDURE — 99232 SBSQ HOSP IP/OBS MODERATE 35: CPT | Performed by: HOSPITALIST

## 2018-12-12 RX ORDER — METOCLOPRAMIDE HYDROCHLORIDE 5 MG/ML
10 INJECTION INTRAMUSCULAR; INTRAVENOUS EVERY 6 HOURS
Status: DISCONTINUED | OUTPATIENT
Start: 2018-12-12 | End: 2018-12-13

## 2018-12-12 NOTE — PROGRESS NOTES
Hospital Day #3    Patient underwent delayed gastric emptying study, UGI, and CT of abdomen  No fever or chills  +flatus, +BM  Still c/o intermittent nausea    /84 (BP Location: Right arm)   Pulse 87   Temp 98.2 °F (36.8 °C) (Oral)   Resp 16   Wt 72. d/c.  Continue IVF  Continue IV protonix  DVT/ulcer prophylaxis  IS  Ambulate    Cleo Fong PA-C

## 2018-12-12 NOTE — TELEPHONE ENCOUNTER
LISSETTE Schmitz has indicated that it is too soon to refill this medication at the pharmacy for your patient.  If you need to renew an existing PA for your patient's medication, please reach out to [facility] directly at [address] [place] [address]

## 2018-12-12 NOTE — PLAN OF CARE
Assumed care of pt at 1900. No acute events overnight. A&Ox4, VSS on RA. NSR on tele. Taking small amounts of full liquids. Mild nausea over night.  NPO since midnight for UGI in am. Midline abd incision with staples, VINCENT, small gauze to distal incision wit

## 2018-12-12 NOTE — PROGRESS NOTES
[facility]ITAL[facility]  Progress Note     Michelle Vo Patient Status:  Inpatient    1955 MRN [id number]   [facility] 7NE-A Attending uSha Schumacher MD   Hosp Day # 2 PCP Coco Smith MD     Chief Complaint: Medical Management    S: mg Oral Q24H   • BuPROPion HCl ER (SR)  200 mg Oral 2 times per day   • Fluticasone Propionate  2 spray Each Nare Daily   • Oxybutynin Chloride ER  10 mg Oral Daily   • enoxaparin  40 mg Subcutaneous Daily       ASSESSMENT / PLAN:     1.  N/V:  Await UGI/CT

## 2018-12-12 NOTE — TELEPHONE ENCOUNTER
Attempted to process PA again for [employer]. Your demographic data has been sent to the plan successfully. They will respond with your clinical questions and you will be notified by email when available within the next business day.  You can also check for

## 2018-12-12 NOTE — PLAN OF CARE
Assumed care at 0700. No acute issues. UGI completed: results still pending. CT abd/pelvis w/o obstuction or leak. GI consulted, now on scheduled IV Reglan. Ok for sips of clear liquids. Ambulates in halls ad tatiana. IVF per order. Will monitor.

## 2018-12-12 NOTE — PROGRESS NOTES
Chart reviewed, case discussed with RN. Await workup including UGI and CT scan. No new medical issues. Patient off floor for testing *2, will see tomorrow.     Fabi Craft MD  [facility]

## 2018-12-12 NOTE — DIETARY NOTE
[facility]    NUTRITION INITIAL ASSESSMENT     Pt does not meet malnutrition criteria.     NUTRITION DIAGNOSIS/PROBLEM:    Inadequate oral intake related to inability to consume sufficient po as evidenced by pt admitted with nausea and  vomiting, NPO FINDINGS:     1. Body Fat/Muscle Mass: BMI: 24.34     2.  Fluid Accumulation: none noted    NUTRITION PRESCRIPTION: based on CBW 72.6 kg  Calories: 6372-4008 calories/day (25-30 calories per kg)  Protein: 80-94 grams protein/day (1.1-1.3 grams protein per k

## 2018-12-12 NOTE — CONSULTS
[facility]                       Gastroenterology [address] [facility] Gastroenterology    [place]ver Patient Status:  Inpatient    1955 MRN [id number]   [facility] 7NE-A Attending Gordon Glover MD   Hosp Day # 2 GLENN Vaz Cha sometimes when I bend over   • Esophageal reflux    • Fatigue    • Flatulence/gas pain/belching    • Frequent urination     over active bladder   • Frequent use of laxatives    • H/O total hysterectomy 1996   • Hearing loss     starting to experience   • H Duodenal resection with duodenojejunostomy, Lysis of adhesions, Omental pedicle flap   • TOTAL ABDOM HYSTERECTOMY       Medications:   acetaminophen (TYLENOL) 650 MG rectal suppository 650 mg 650 mg Rectal Q6H PRN   ondansetron HCl (ZOFRAN) injection 4 mg disease  ROS:  In addition to the pertinent positives described above:             Infectious Disease:  No chronic infections or recent fevers prior to the acute illness            Cardiovascular: + HTN, AAA s/p repair             Respiratory: No shortness Results   Component Value Date    WBC 6.0 12/12/2018    HGB 11.1 12/12/2018    HCT 35.2 12/12/2018    .0 12/12/2018    CREATSERUM 0.91 12/12/2018    BUN 11 12/12/2018     12/12/2018    K 4.1 12/12/2018     12/12/2018    CO2 25.0 12/12/20 evaluation. Normal non contrast appearance of the liver, spleen, adrenal glands and kidneys. The pancreas is poorly   visualized on this non contrast exam.     Postsurgical changes present in the region of the duodenum.   No free extravasation of oral con INDICATIONS:  s/p duodenal resection now with N/V     TECHNIQUE:  Tc-99m sulfur colloid was microwaved with 2 egg equivalent Eggbeater(tm), and fed to the patient with a standard 300 kcal meal including one slice of bread, a pat of butter, and 8 oz water MD  ______________________________________________    EGD: 10/11/2018: Dr Zee Glover  Indication: Abd pain, heartburn, dysphagia   Findings: The esophagus was normal, without masses, polyps, ulcers, erosions,  diverticula, or varices.  The squamocolumnar junct by my NP, Tano Adams [facility]). 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   This polyp was not able to be safely resected endoscopicall reading of the upper GI study as well.

## 2018-12-13 VITALS
RESPIRATION RATE: 16 BRPM | SYSTOLIC BLOOD PRESSURE: 132 MMHG | WEIGHT: 159.38 LBS | TEMPERATURE: 98 F | OXYGEN SATURATION: 98 % | HEART RATE: 83 BPM | DIASTOLIC BLOOD PRESSURE: 84 MMHG | BODY MASS INDEX: 24 KG/M2

## 2018-12-13 PROCEDURE — 99231 SBSQ HOSP IP/OBS SF/LOW 25: CPT | Performed by: HOSPITALIST

## 2018-12-13 RX ORDER — METOCLOPRAMIDE HYDROCHLORIDE 5 MG/5ML
10 SOLUTION ORAL
Qty: 1200 ML | Refills: 0 | Status: SHIPPED | OUTPATIENT
Start: 2018-12-13 | End: 2019-01-12

## 2018-12-13 RX ORDER — METOCLOPRAMIDE HYDROCHLORIDE 5 MG/5ML
10 SOLUTION ORAL
Status: DISCONTINUED | OUTPATIENT
Start: 2018-12-13 | End: 2018-12-13

## 2018-12-13 NOTE — DIETARY NOTE
Nutrition Short Note    Dietitian consult received for diet education. Discussed full liquid and low fiber diet. Pt receptive, expect compliance.  All questions answered and handout with RD contact information provided.  full follow up per moderate nutritio

## 2018-12-13 NOTE — PLAN OF CARE
Assumed care at [address]. AOx4. Pain is controlled. Denies nausea. Abdominal incision with staples and gauze. UGI result pending. IV fluid infusing. Continue to monitor.     GASTROINTESTINAL - ADULT    • Minimal or absence of nausea and vomiting Progressin

## 2018-12-13 NOTE — PLAN OF CARE
GASTROINTESTINAL - ADULT    • Minimal or absence of nausea and vomiting Adequate for Discharge        Patient/Family Goals    • Patient/Family Long Term Goal Adequate for Discharge    • Patient/Family Short Term Goal Adequate for Discharge            All c

## 2018-12-13 NOTE — PROGRESS NOTES
Gastroenterology Progress Note  Jose Antonio Necessary Patient Status:  Inpatient    1955 MRN [id number]   [facility] 7NE-A Attending Airam Monique MD   Hosp Day # 3 PCP Manuela Shankar MD GI AIR CONTRAST+SBS (CPT=[id number]), 11/12/2018, 7:47. LEONID SALINAS GI GASTRIC EMPTYING STUDY    (ZKZ=[id number]), 12/11/2018, 11:47.      INDICATIONS:  gastric emptying study and evaluate duodenal anastomosis     PATIENT STATED HISTORY: (As transcribed by [employer] bloating, and inability to tolerate PO intake. NM gastric emptying scan revealing delay at the 2 and 4 hour jaida (30 %, 48%)and an upper GI suggested no obstruction.  Symptoms most likely d/t post-op edema with possible ileus and symptoms appear to be impro

## 2018-12-13 NOTE — PROGRESS NOTES
Hospital Day #4    Patient underwent delayed gastric emptying study, UGI, and CT of abdomen  Evaluated by GI yestreday  No fever or chills  +flatus, +BM  No N/V overnight.     BP (!) 131/92 (BP Location: Right arm)   Pulse 83   Temp 97.5 °F (36.4 °C) (Oral) prophylaxis  IS  Ambulate  Plan to discharge later today if tolerating diet. Case also discussed with Chava KEITH.     Faizan Gaitan PA-C

## 2018-12-13 NOTE — PROGRESS NOTES
[facility]ITAL[facility]  Progress Note     Karla Swift Patient Status:  Inpatient    1955 MRN [id number]   [facility] 7NE-A Attending Suellyn Ganser, MD   Hosp Day # 3 PCP Bairon Gómez MD     Chief Complaint: Medical Management    S: Q24H    Or   • Pantoprazole Sodium  40 mg Oral Q24H   • BuPROPion HCl ER (SR)  200 mg Oral 2 times per day   • Fluticasone Propionate  2 spray Each Nare Daily   • enoxaparin  40 mg Subcutaneous Daily       ASSESSMENT / PLAN:     1.  N/V:  Tolerating clears,

## 2018-12-13 NOTE — TELEPHONE ENCOUNTER
Prior authorization processed.    Approved 12/12/18-12/12/19  Approval sent to scan  Patient informed

## 2018-12-14 ENCOUNTER — TELEPHONE (OUTPATIENT)
Dept: SURGERY | Facility: CLINIC | Age: 63
End: 2018-12-14

## 2018-12-14 ENCOUNTER — PATIENT OUTREACH (OUTPATIENT)
Dept: CASE MANAGEMENT | Age: 63
End: 2018-12-14

## 2018-12-14 ENCOUNTER — TELEPHONE (OUTPATIENT)
Dept: GASTROENTEROLOGY | Facility: HOSPITAL | Age: 63
End: 2018-12-14

## 2018-12-14 NOTE — CM/SW NOTE
12/14/18 0900   Discharge disposition   Expected discharge disposition Home or Self   Name of [address] [place] [address] services after discharge None   Discharge transportation Private car

## 2018-12-14 NOTE — TELEPHONE ENCOUNTER
Call to pt to follow up and see how she is feeling as well as to schedule post op appointment with Dr. Laci Gurrola on 12/19/18. [facility] to call back.

## 2018-12-14 NOTE — TELEPHONE ENCOUNTER
Received call from [facility] ([phone]) that Reglan 10 mg PO TID AC/HS interacts with pt's Lexapro and Bupropion and it is recommended that the dose be lowered to 5 mg. This was discussed with Dr Graciela Paez who agreed with dose change.  [facility] was contacted and pt w

## 2018-12-14 NOTE — DISCHARGE SUMMARY
[facility]  Discharge Summary    Preston Velazquez Patient Status:  Inpatient    1955 MRN [id number]   [facility] 7NE-A Attending No att. providers found   Hosp Day # 3 PCP Kaushal Bhardwaj MD     Date of Admission: 12/10/2018    Da length at the duodenum 3rd portion distal to mayor ampulla.     The patient underwent duodenal resection with duodenojejunostomy on 12/4/18. Her hospital course was unremarkable.  She was ambulating, tolerating a low fiber diet, and pain was controlled PO p 20 MG Oral Capsule Delayed Release  Take 20 mg by mouth daily. , Historical    !! BuPROPion HCl ER, XL, 150 MG Oral Tablet 24 Hr  Take 1 tablet (150 mg total) by mouth daily. , Normal, Disp-90 tablet, R-3Take with 300 mg for total of 450    !!  BuPROPion HCl

## 2018-12-17 ENCOUNTER — OFFICE VISIT (OUTPATIENT)
Dept: SURGERY | Facility: CLINIC | Age: 63
End: 2018-12-17
Payer: COMMERCIAL

## 2018-12-17 ENCOUNTER — TELEPHONE (OUTPATIENT)
Dept: SURGERY | Facility: CLINIC | Age: 63
End: 2018-12-17

## 2018-12-17 VITALS
WEIGHT: 158 LBS | DIASTOLIC BLOOD PRESSURE: 87 MMHG | SYSTOLIC BLOOD PRESSURE: 114 MMHG | TEMPERATURE: 99 F | RESPIRATION RATE: 18 BRPM | HEIGHT: 68 IN | HEART RATE: 97 BPM | OXYGEN SATURATION: 98 % | BODY MASS INDEX: 23.95 KG/M2

## 2018-12-17 DIAGNOSIS — L03.311 CELLULITIS OF ABDOMINAL WALL: Primary | ICD-10-CM

## 2018-12-17 DIAGNOSIS — D13.2 DUODENAL ADENOMA: ICD-10-CM

## 2018-12-17 PROCEDURE — 99024 POSTOP FOLLOW-UP VISIT: CPT | Performed by: PHYSICIAN ASSISTANT

## 2018-12-17 RX ORDER — CEPHALEXIN 500 MG/1
500 CAPSULE ORAL 2 TIMES DAILY
Qty: 10 CAPSULE | Refills: 0 | Status: SHIPPED | OUTPATIENT
Start: 2018-12-17 | End: 2019-08-23 | Stop reason: ALTCHOICE

## 2018-12-17 NOTE — TELEPHONE ENCOUNTER
Returned patient's call. Patient reports she has been changing her incisional wound dressing daily as instructed and states she has noticed a foul odor.  Patient will be seen today at 12:30pm by ACE Bishop.

## 2018-12-20 NOTE — PAYOR COMM NOTE
--------------  CONTINUED STAY REVIEW    Payor: DOMINIC FONSECA  Subscriber #:  [id number]  Authorization Number: [id number]    Admit date: 8/15/18  Admit time: 1730    Admitting Physician: Juan Lieberman MD  Attending Physician:  No att. providers found  P 3350 (MIRALAX) powder packet 17 g Order Report Daily 08/18/18 08/21/18   aspirin tab 325 mg Order Report Daily 08/18/18 08/21/18   famoTIDine (PEPCID) tab 20 mg Order Report 2 times daily 08/18/18 08/20/18   bisacodyl (DULCOLAX) rectal suppository 10 mg Or 08/17/18   morphINE sulfate (PF) 4 MG/ML injection 2 mg Order Report Every 5 min PRN 08/16/18 08/16/18   CeFAZolin Sodium (ANCEF) 1 g injection Order Report Intra-op PRN 08/16/18 08/16/18   heparin 5000 units in 0.9% NaCl 500ml injection Order Report As ne Ear Star Wedge Flap Text: The defect edges were debeveled with a #15 blade scalpel.  Given the location of the defect and the proximity to free margins (helical rim) an ear star wedge flap was deemed most appropriate.  Using a sterile surgical marker, the appropriate flap was drawn incorporating the defect and placing the expected incisions between the helical rim and antihelix where possible.  The area thus outlined was incised through and through with a #15 scalpel blade.

## 2018-12-24 ENCOUNTER — TELEPHONE (OUTPATIENT)
Dept: SURGERY | Facility: CLINIC | Age: 63
End: 2018-12-24

## 2018-12-24 NOTE — TELEPHONE ENCOUNTER
Pt left message stating that she ran out of packing strips for her incision and wanted to know if there is any available in the office for her to come by and get. LVMM message on home phone for pt to call back.  Informed pt that the office is only open u

## 2018-12-26 ENCOUNTER — OFFICE VISIT (OUTPATIENT)
Dept: SURGERY | Facility: CLINIC | Age: 63
End: 2018-12-26
Payer: COMMERCIAL

## 2018-12-26 VITALS
BODY MASS INDEX: 23.77 KG/M2 | OXYGEN SATURATION: 100 % | DIASTOLIC BLOOD PRESSURE: 84 MMHG | TEMPERATURE: 98 F | HEIGHT: 68 IN | SYSTOLIC BLOOD PRESSURE: 122 MMHG | WEIGHT: 156.81 LBS | RESPIRATION RATE: 16 BRPM | HEART RATE: 80 BPM

## 2018-12-26 DIAGNOSIS — K21.9 GASTROESOPHAGEAL REFLUX DISEASE, ESOPHAGITIS PRESENCE NOT SPECIFIED: ICD-10-CM

## 2018-12-26 DIAGNOSIS — D13.2 DUODENAL ADENOMA: Primary | ICD-10-CM

## 2018-12-26 PROCEDURE — 99024 POSTOP FOLLOW-UP VISIT: CPT | Performed by: SURGERY

## 2018-12-26 NOTE — PROGRESS NOTES
[facility]    Patient Name:  Andrew Garcia   YOB: 1955   Gender:  Female   Appt Date:  12/26/2018   Provider:  Mayra Caballero MD   Insurance:  Lonnie Aly MD   Addr On 11/6/2018 Dr. Teodoro Lee performed an EGD with submucosal injection.  No resection was performed. 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 , Disp: 1 Bottle, Rfl: 2  •  loratadine 10 MG Oral Tab, Take 10 mg by mouth daily. , Disp: , Rfl:   •  omeprazole 20 MG Oral Capsule Delayed Release, Take 20 • Night sweats     for years.    • Overactive bladder    • Pain in joints    • Pain with bowel movements 07/12/2018    have to strain sometimes   • Popliteal fullness 3/16/2017   • S/P AAA repair using bifurcation graft 8/27/2018   • Sleep disturbance    • Eyes: non-icteric. Abdomen: Soft, non-tender, non-distended. Incision healing well without drainage or erythema. There inferior aspect of the incision with murky drainage. Musculoskeletal: no edema. Document Review:  None     Procedure(s):   [place]

## 2018-12-27 ENCOUNTER — OFFICE VISIT (OUTPATIENT)
Dept: SURGERY | Facility: CLINIC | Age: 63
End: 2018-12-27
Payer: COMMERCIAL

## 2018-12-27 ENCOUNTER — MEDICAL CORRESPONDENCE (OUTPATIENT)
Dept: SURGERY | Facility: CLINIC | Age: 63
End: 2018-12-27

## 2018-12-27 VITALS
SYSTOLIC BLOOD PRESSURE: 112 MMHG | TEMPERATURE: 98 F | HEART RATE: 81 BPM | WEIGHT: 156 LBS | DIASTOLIC BLOOD PRESSURE: 76 MMHG | BODY MASS INDEX: 23.64 KG/M2 | RESPIRATION RATE: 18 BRPM | HEIGHT: 68 IN | OXYGEN SATURATION: 100 %

## 2018-12-27 DIAGNOSIS — Z51.89 VISIT FOR WOUND CHECK: Primary | ICD-10-CM

## 2018-12-27 PROCEDURE — 99024 POSTOP FOLLOW-UP VISIT: CPT | Performed by: PHYSICIAN ASSISTANT

## 2018-12-27 NOTE — PROGRESS NOTES
HPI:  The patient is a 58-year-old woman who recently underwent surgical resection of duodenal adenoma on 12/4/18. She presents today for abdominal wound dressing change for inferior portion of midline incision s/p incision and drainage 1 day ago.  Robyn Mcmillan

## 2018-12-27 NOTE — PROGRESS NOTES
Home health orders faxed over to [facility] home [facility] for daily wound care to [phone].

## 2018-12-28 ENCOUNTER — NURSE ONLY (OUTPATIENT)
Dept: SURGERY | Facility: CLINIC | Age: 63
End: 2018-12-28
Payer: COMMERCIAL

## 2018-12-28 NOTE — PROGRESS NOTES
Patient here for incisional wound dressing change. Wound pink, scan drainage. Packed with dry gauze and taught patient dressing change. Given supplies and will f/u with home care referral today.  Post op with Ana Tao PA-C scheduled for 10am on 12/31/1

## 2018-12-31 ENCOUNTER — OFFICE VISIT (OUTPATIENT)
Dept: SURGERY | Facility: CLINIC | Age: 63
End: 2018-12-31
Payer: COMMERCIAL

## 2018-12-31 DIAGNOSIS — Z51.89 VISIT FOR WOUND CHECK: Primary | ICD-10-CM

## 2018-12-31 PROCEDURE — 99024 POSTOP FOLLOW-UP VISIT: CPT | Performed by: PHYSICIAN ASSISTANT

## 2019-01-02 ENCOUNTER — OFFICE VISIT (OUTPATIENT)
Dept: SURGERY | Facility: CLINIC | Age: 64
End: 2019-01-02
Payer: COMMERCIAL

## 2019-01-02 VITALS
DIASTOLIC BLOOD PRESSURE: 82 MMHG | SYSTOLIC BLOOD PRESSURE: 128 MMHG | WEIGHT: 161 LBS | BODY MASS INDEX: 24 KG/M2 | HEART RATE: 87 BPM | TEMPERATURE: 98 F

## 2019-01-02 DIAGNOSIS — Z48.89 ENCOUNTER FOR POST SURGICAL WOUND CHECK: Primary | ICD-10-CM

## 2019-01-02 PROCEDURE — 99024 POSTOP FOLLOW-UP VISIT: CPT | Performed by: PHYSICIAN ASSISTANT

## 2019-01-02 NOTE — PROGRESS NOTES
HPI:  The patient is a 58-year-old woman who recently underwent surgical resection of duodenal adenoma on 12/4/18.     She presents today for abdominal wound dressing change for inferior portion of midline incision s/p incision and drainage in office on 12/

## 2019-01-04 ENCOUNTER — OFFICE VISIT (OUTPATIENT)
Dept: SURGERY | Facility: CLINIC | Age: 64
End: 2019-01-04
Payer: COMMERCIAL

## 2019-01-04 DIAGNOSIS — Z51.89 VISIT FOR WOUND CHECK: Primary | ICD-10-CM

## 2019-01-04 PROCEDURE — 99024 POSTOP FOLLOW-UP VISIT: CPT | Performed by: PHYSICIAN ASSISTANT

## 2019-01-07 ENCOUNTER — MED REC SCAN ONLY (OUTPATIENT)
Dept: INTERNAL MEDICINE CLINIC | Facility: CLINIC | Age: 64
End: 2019-01-07

## 2019-01-09 ENCOUNTER — OFFICE VISIT (OUTPATIENT)
Dept: SURGERY | Facility: CLINIC | Age: 64
End: 2019-01-09
Payer: COMMERCIAL

## 2019-01-09 VITALS
HEART RATE: 91 BPM | WEIGHT: 161 LBS | RESPIRATION RATE: 18 BRPM | OXYGEN SATURATION: 100 % | DIASTOLIC BLOOD PRESSURE: 89 MMHG | SYSTOLIC BLOOD PRESSURE: 123 MMHG | BODY MASS INDEX: 24.4 KG/M2 | HEIGHT: 68 IN | TEMPERATURE: 98 F

## 2019-01-09 DIAGNOSIS — Z48.89 ENCOUNTER FOR POST SURGICAL WOUND CHECK: Primary | ICD-10-CM

## 2019-01-09 PROCEDURE — 99024 POSTOP FOLLOW-UP VISIT: CPT | Performed by: PHYSICIAN ASSISTANT

## 2019-01-16 ENCOUNTER — OFFICE VISIT (OUTPATIENT)
Dept: SURGERY | Facility: CLINIC | Age: 64
End: 2019-01-16
Payer: COMMERCIAL

## 2019-01-16 VITALS
DIASTOLIC BLOOD PRESSURE: 76 MMHG | SYSTOLIC BLOOD PRESSURE: 128 MMHG | TEMPERATURE: 98 F | RESPIRATION RATE: 18 BRPM | OXYGEN SATURATION: 100 % | HEIGHT: 68 IN | BODY MASS INDEX: 23.79 KG/M2 | WEIGHT: 157 LBS | HEART RATE: 87 BPM

## 2019-01-16 DIAGNOSIS — Z51.89 VISIT FOR WOUND CHECK: Primary | ICD-10-CM

## 2019-01-16 PROCEDURE — 99024 POSTOP FOLLOW-UP VISIT: CPT | Performed by: PHYSICIAN ASSISTANT

## 2019-01-16 NOTE — PROGRESS NOTES
HPI:  The patient is a 58-year-old woman who recently underwent surgical resection of duodenal adenoma on 12/4/18. She presents today for abdominal wound check for inferior portion of midline incision s/p incision and drainage in office on 12/26/18.  Jessica Greer

## 2019-02-07 ENCOUNTER — OFFICE VISIT (OUTPATIENT)
Dept: SURGERY | Facility: CLINIC | Age: 64
End: 2019-02-07
Payer: COMMERCIAL

## 2019-02-07 VITALS
HEART RATE: 83 BPM | WEIGHT: 162 LBS | RESPIRATION RATE: 16 BRPM | SYSTOLIC BLOOD PRESSURE: 116 MMHG | DIASTOLIC BLOOD PRESSURE: 75 MMHG | BODY MASS INDEX: 25 KG/M2 | OXYGEN SATURATION: 100 % | TEMPERATURE: 97 F

## 2019-02-07 DIAGNOSIS — Z51.89 VISIT FOR WOUND CHECK: Primary | ICD-10-CM

## 2019-02-07 DIAGNOSIS — D13.2 DUODENAL ADENOMA: ICD-10-CM

## 2019-02-07 PROCEDURE — 99024 POSTOP FOLLOW-UP VISIT: CPT | Performed by: PHYSICIAN ASSISTANT

## 2019-02-07 NOTE — PROGRESS NOTES
Here for wound check today. Pt reports no drainage to wound recently. No concerns or complaints noted.

## 2019-02-08 NOTE — PROGRESS NOTES
HPI:  The patient is a 58-year-old woman who underwent surgical resection of duodenal adenoma on 12/4/18. She presents today for abdominal wound check for inferior portion of midline incision s/p incision and drainage in office on 12/26/18.  Patient repo

## 2019-03-04 PROBLEM — Z90.49 S/P SMALL BOWEL RESECTION: Status: ACTIVE | Noted: 2018-12-04

## 2019-03-04 PROBLEM — R10.9 STOMACH ACHE: Status: RESOLVED | Noted: 2018-10-11 | Resolved: 2019-03-04

## 2019-04-10 RX ORDER — HYDROCHLOROTHIAZIDE 12.5 MG/1
TABLET ORAL
Qty: 90 TABLET | Refills: 0 | Status: SHIPPED | OUTPATIENT
Start: 2019-04-10 | End: 2019-08-23

## 2019-04-10 RX ORDER — LISINOPRIL 20 MG/1
TABLET ORAL
Qty: 90 TABLET | Refills: 0 | Status: SHIPPED | OUTPATIENT
Start: 2019-04-10 | End: 2019-08-23

## 2019-04-10 RX ORDER — TOLTERODINE 4 MG/1
CAPSULE, EXTENDED RELEASE ORAL
Qty: 90 CAPSULE | Refills: 1 | Status: SHIPPED | OUTPATIENT
Start: 2019-04-10 | End: 2019-08-23

## 2019-04-10 NOTE — TELEPHONE ENCOUNTER
LOV-11/19/18 JL  FOV-none  LAST RX-5/18/18 90 tabs 3 refills  LAST LABS-12/10/18  PER PROTOCOL-to provider

## 2019-06-19 ENCOUNTER — TELEPHONE (OUTPATIENT)
Dept: INTERNAL MEDICINE CLINIC | Facility: CLINIC | Age: 64
End: 2019-06-19

## 2019-06-19 DIAGNOSIS — Z13.0 SCREENING FOR BLOOD DISEASE: ICD-10-CM

## 2019-06-19 DIAGNOSIS — Z13.220 SCREENING FOR LIPID DISORDERS: ICD-10-CM

## 2019-06-19 DIAGNOSIS — Z00.00 ROUTINE GENERAL MEDICAL EXAMINATION AT A HEALTH CARE FACILITY: Primary | ICD-10-CM

## 2019-06-19 DIAGNOSIS — Z13.228 SCREENING FOR METABOLIC DISORDER: ICD-10-CM

## 2019-06-19 NOTE — TELEPHONE ENCOUNTER
Lab orders for cpe   Future Appointments   Date Time Provider Deepti Harvey   8/23/2019  6:30 AM Petey Garcia MD [facility]            Lab is edward.  Pt aware to fast

## 2019-07-15 RX ORDER — BUPROPION HYDROCHLORIDE 300 MG/1
TABLET ORAL
Qty: 90 TABLET | Refills: 2 | Status: SHIPPED | OUTPATIENT
Start: 2019-07-15 | End: 2019-08-23

## 2019-07-15 NOTE — TELEPHONE ENCOUNTER
Last Office Visit: 11-19-18 with VERONICA for pre op  Last Rx Filled: 5-18-18 90 tabs with 3 refills   Last Labs: 12-13-18 bmp/magnesium  Future Appointment: 8-23-19    Per protocol to provider

## 2019-08-19 ENCOUNTER — PATIENT MESSAGE (OUTPATIENT)
Dept: INTERNAL MEDICINE CLINIC | Facility: CLINIC | Age: 64
End: 2019-08-19

## 2019-08-19 NOTE — TELEPHONE ENCOUNTER
From: Carlos Suarez  To: Jose Taveras MD  Sent: 8/19/2019 6:40 AM CDT  Subject: Test Results Question    Dr. Trever Manning,  I have scheduled an appointment for August 23 for my annual exam (about a month ago).  Again, I have asked to have an order submi

## 2019-08-20 ENCOUNTER — LAB ENCOUNTER (OUTPATIENT)
Dept: LAB | Age: 64
End: 2019-08-20
Attending: INTERNAL MEDICINE
Payer: COMMERCIAL

## 2019-08-20 DIAGNOSIS — Z13.228 SCREENING FOR METABOLIC DISORDER: ICD-10-CM

## 2019-08-20 DIAGNOSIS — Z13.0 SCREENING FOR BLOOD DISEASE: ICD-10-CM

## 2019-08-20 DIAGNOSIS — Z13.220 SCREENING FOR LIPID DISORDERS: ICD-10-CM

## 2019-08-20 DIAGNOSIS — Z00.00 ROUTINE GENERAL MEDICAL EXAMINATION AT A HEALTH CARE FACILITY: ICD-10-CM

## 2019-08-20 LAB
ALBUMIN SERPL-MCNC: 3.8 G/DL (ref 3.4–5)
ALBUMIN/GLOB SERPL: 1.2 {RATIO} (ref 1–2)
ALP LIVER SERPL-CCNC: 128 U/L (ref 50–130)
ALT SERPL-CCNC: 29 U/L (ref 13–56)
ANION GAP SERPL CALC-SCNC: 6 MMOL/L (ref 0–18)
AST SERPL-CCNC: 23 U/L (ref 15–37)
BASOPHILS # BLD AUTO: 0.04 X10(3) UL (ref 0–0.2)
BASOPHILS NFR BLD AUTO: 1 %
BILIRUB SERPL-MCNC: 0.5 MG/DL (ref 0.1–2)
BUN BLD-MCNC: 22 MG/DL (ref 7–18)
BUN/CREAT SERPL: 17.3 (ref 10–20)
CALCIUM BLD-MCNC: 9.2 MG/DL (ref 8.5–10.1)
CHLORIDE SERPL-SCNC: 107 MMOL/L (ref 98–112)
CHOLEST SMN-MCNC: 190 MG/DL (ref ?–200)
CO2 SERPL-SCNC: 30 MMOL/L (ref 21–32)
CREAT BLD-MCNC: 1.27 MG/DL (ref 0.55–1.02)
DEPRECATED RDW RBC AUTO: 42.8 FL (ref 35.1–46.3)
EOSINOPHIL # BLD AUTO: 0.16 X10(3) UL (ref 0–0.7)
EOSINOPHIL NFR BLD AUTO: 4 %
ERYTHROCYTE [DISTWIDTH] IN BLOOD BY AUTOMATED COUNT: 13.3 % (ref 11–15)
GLOBULIN PLAS-MCNC: 3.3 G/DL (ref 2.8–4.4)
GLUCOSE BLD-MCNC: 71 MG/DL (ref 70–99)
HCT VFR BLD AUTO: 43.8 % (ref 35–48)
HDLC SERPL-MCNC: 75 MG/DL (ref 40–59)
HGB BLD-MCNC: 14 G/DL (ref 12–16)
IMM GRANULOCYTES # BLD AUTO: 0.01 X10(3) UL (ref 0–1)
IMM GRANULOCYTES NFR BLD: 0.3 %
LDLC SERPL CALC-MCNC: 104 MG/DL (ref ?–100)
LYMPHOCYTES # BLD AUTO: 1.24 X10(3) UL (ref 1–4)
LYMPHOCYTES NFR BLD AUTO: 31.1 %
M PROTEIN MFR SERPL ELPH: 7.1 G/DL (ref 6.4–8.2)
MCH RBC QN AUTO: 28.2 PG (ref 26–34)
MCHC RBC AUTO-ENTMCNC: 32 G/DL (ref 31–37)
MCV RBC AUTO: 88.3 FL (ref 80–100)
MONOCYTES # BLD AUTO: 0.38 X10(3) UL (ref 0.1–1)
MONOCYTES NFR BLD AUTO: 9.5 %
NEUTROPHILS # BLD AUTO: 2.16 X10 (3) UL (ref 1.5–7.7)
NEUTROPHILS # BLD AUTO: 2.16 X10(3) UL (ref 1.5–7.7)
NEUTROPHILS NFR BLD AUTO: 54.1 %
NONHDLC SERPL-MCNC: 115 MG/DL (ref ?–130)
OSMOLALITY SERPL CALC.SUM OF ELEC: 298 MOSM/KG (ref 275–295)
PLATELET # BLD AUTO: 199 10(3)UL (ref 150–450)
POTASSIUM SERPL-SCNC: 4.5 MMOL/L (ref 3.5–5.1)
RBC # BLD AUTO: 4.96 X10(6)UL (ref 3.8–5.3)
SODIUM SERPL-SCNC: 143 MMOL/L (ref 136–145)
TRIGL SERPL-MCNC: 56 MG/DL (ref 30–149)
VLDLC SERPL CALC-MCNC: 11 MG/DL (ref 0–30)
WBC # BLD AUTO: 4 X10(3) UL (ref 4–11)

## 2019-08-20 PROCEDURE — 85025 COMPLETE CBC W/AUTO DIFF WBC: CPT | Performed by: INTERNAL MEDICINE

## 2019-08-20 PROCEDURE — 36415 COLL VENOUS BLD VENIPUNCTURE: CPT | Performed by: INTERNAL MEDICINE

## 2019-08-20 PROCEDURE — 80053 COMPREHEN METABOLIC PANEL: CPT | Performed by: INTERNAL MEDICINE

## 2019-08-20 PROCEDURE — 80061 LIPID PANEL: CPT | Performed by: INTERNAL MEDICINE

## 2019-08-23 ENCOUNTER — OFFICE VISIT (OUTPATIENT)
Dept: INTERNAL MEDICINE CLINIC | Facility: CLINIC | Age: 64
End: 2019-08-23
Payer: COMMERCIAL

## 2019-08-23 ENCOUNTER — TELEPHONE (OUTPATIENT)
Dept: INTERNAL MEDICINE CLINIC | Facility: CLINIC | Age: 64
End: 2019-08-23

## 2019-08-23 VITALS
WEIGHT: 171 LBS | OXYGEN SATURATION: 99 % | BODY MASS INDEX: 25.91 KG/M2 | DIASTOLIC BLOOD PRESSURE: 80 MMHG | HEART RATE: 72 BPM | RESPIRATION RATE: 16 BRPM | SYSTOLIC BLOOD PRESSURE: 110 MMHG | HEIGHT: 68 IN

## 2019-08-23 DIAGNOSIS — Z90.49 S/P SMALL BOWEL RESECTION: ICD-10-CM

## 2019-08-23 DIAGNOSIS — Z12.31 ENCOUNTER FOR SCREENING MAMMOGRAM FOR BREAST CANCER: ICD-10-CM

## 2019-08-23 DIAGNOSIS — I10 ESSENTIAL HYPERTENSION: ICD-10-CM

## 2019-08-23 DIAGNOSIS — Z95.828 S/P AAA REPAIR USING BIFURCATION GRAFT: ICD-10-CM

## 2019-08-23 DIAGNOSIS — Z00.00 ROUTINE GENERAL MEDICAL EXAMINATION AT A HEALTH CARE FACILITY: ICD-10-CM

## 2019-08-23 DIAGNOSIS — N32.81 OAB (OVERACTIVE BLADDER): Primary | ICD-10-CM

## 2019-08-23 DIAGNOSIS — Z51.81 ENCOUNTER FOR THERAPEUTIC DRUG MONITORING: ICD-10-CM

## 2019-08-23 DIAGNOSIS — K21.9 GASTROESOPHAGEAL REFLUX DISEASE, ESOPHAGITIS PRESENCE NOT SPECIFIED: ICD-10-CM

## 2019-08-23 DIAGNOSIS — Z86.79 S/P AAA REPAIR USING BIFURCATION GRAFT: ICD-10-CM

## 2019-08-23 DIAGNOSIS — F32.89 OTHER DEPRESSION: ICD-10-CM

## 2019-08-23 DIAGNOSIS — E66.9 OBESITY (BMI 30-39.9): ICD-10-CM

## 2019-08-23 PROCEDURE — 99396 PREV VISIT EST AGE 40-64: CPT | Performed by: INTERNAL MEDICINE

## 2019-08-23 PROCEDURE — 99212 OFFICE O/P EST SF 10 MIN: CPT | Performed by: INTERNAL MEDICINE

## 2019-08-23 RX ORDER — TOLTERODINE 4 MG/1
4 CAPSULE, EXTENDED RELEASE ORAL
Qty: 90 CAPSULE | Refills: 3 | Status: SHIPPED | OUTPATIENT
Start: 2019-08-23 | End: 2020-08-28

## 2019-08-23 RX ORDER — BUPROPION HYDROCHLORIDE 150 MG/1
150 TABLET ORAL DAILY
Qty: 90 TABLET | Refills: 3 | Status: SHIPPED | OUTPATIENT
Start: 2019-08-23 | End: 2020-09-29

## 2019-08-23 RX ORDER — HYDROCHLOROTHIAZIDE 12.5 MG/1
12.5 TABLET ORAL
Qty: 90 TABLET | Refills: 3 | Status: SHIPPED | OUTPATIENT
Start: 2019-08-23 | End: 2020-08-28

## 2019-08-23 RX ORDER — FLUTICASONE PROPIONATE 50 MCG
SPRAY, SUSPENSION (ML) NASAL
Qty: 16 G | Refills: 2 | Status: SHIPPED | OUTPATIENT
Start: 2019-08-23 | End: 2020-09-02

## 2019-08-23 RX ORDER — LISINOPRIL 20 MG/1
TABLET ORAL
Qty: 90 TABLET | Refills: 3 | Status: SHIPPED | OUTPATIENT
Start: 2019-08-23 | End: 2020-08-28

## 2019-08-23 RX ORDER — BUPROPION HYDROCHLORIDE 300 MG/1
300 TABLET ORAL
Qty: 90 TABLET | Refills: 3 | Status: SHIPPED | OUTPATIENT
Start: 2019-08-23 | End: 2020-09-29

## 2019-08-23 RX ORDER — LANSOPRAZOLE 30 MG/1
30 CAPSULE, DELAYED RELEASE ORAL
Qty: 90 CAPSULE | Refills: 3 | Status: SHIPPED | OUTPATIENT
Start: 2019-08-23 | End: 2019-10-28

## 2019-08-23 NOTE — PROGRESS NOTES
Please let pt know Dr. Rosaura Herrmann will be reaching out to her    HPI:    Patient ID: Bela Rios is a 58year old female.     HPI  Pt here for pe, sp AAA repair last year, ho small bowl mass, benign, due for fu with Dr. Rosaura Herrmann, htn, depression controlled, a Disp:  Rfl:      Allergies:No Known Allergies   PHYSICAL EXAM:   Physical Exam   Constitutional: She is oriented to person, place, and time. She appears well-developed and well-nourished. HENT:   Head: Normocephalic and atraumatic.    Right Ear: External Capsule SR 24 Hr 90 capsule 3     Sig: Take 1 capsule (4 mg total) by mouth once daily. • hydrochlorothiazide 12.5 MG Oral Tab 90 tablet 3     Sig: Take 1 tablet (12.5 mg total) by mouth once daily.    • lisinopril 20 MG Oral Tab 90 tablet 3     Sig: TAKE

## 2019-08-23 NOTE — TELEPHONE ENCOUNTER
Message   Received:  Today   Message Contents   Satish Mullen MD  P [facility] Staff          Previous Messages      ----- Message -----   From: Lorelei Gilmore MD   Sent: 8/23/2019   7:59 AM CDT   To: Ananda De La Vega MD     Will reach out to he

## 2019-08-23 NOTE — TELEPHONE ENCOUNTER
[phone]   for pt ([id number] [address] per HIPAA) to inform, per JL, spoke with GI- Dr. Deann Garcia regarding recommendation for earlier fu scope after pt's small bowel resection. Per Dr. Deann Garcia, their office will reach out to pt today and schedule a follow-up to discuss.

## 2019-10-28 ENCOUNTER — PATIENT MESSAGE (OUTPATIENT)
Dept: INTERNAL MEDICINE CLINIC | Facility: CLINIC | Age: 64
End: 2019-10-28

## 2019-10-28 RX ORDER — LANSOPRAZOLE 30 MG/1
30 CAPSULE, DELAYED RELEASE ORAL 2 TIMES DAILY
Qty: 180 CAPSULE | Refills: 3 | Status: SHIPPED | OUTPATIENT
Start: 2019-10-28 | End: 2020-10-12

## 2019-10-28 NOTE — TELEPHONE ENCOUNTER
From: Radha Mckeon  To: Judie Oconnor MD  Sent: 10/28/2019 11:38 AM CDT  Subject: Prescription Question    Dr. Lindsay Barraza has increased my doses of Lapazole (for my acid reflux) to twice a day.  I am running out of pills and would like to know if you can

## 2019-10-28 NOTE — TELEPHONE ENCOUNTER
Last refill 8/23/2019 90 capsules 3 refills  LOV: 08/23/2019 with JL. Refill pended for your review and approval if ok.  Please advise

## 2019-12-07 ENCOUNTER — HOSPITAL ENCOUNTER (OUTPATIENT)
Dept: MAMMOGRAPHY | Age: 64
Discharge: HOME | End: 2019-12-07
Attending: INTERNAL MEDICINE
Payer: COMMERCIAL

## 2019-12-07 DIAGNOSIS — Z12.31 ENCOUNTER FOR SCREENING MAMMOGRAM FOR BREAST CANCER: ICD-10-CM

## 2019-12-07 PROCEDURE — 77067 SCR MAMMO BI INCL CAD: CPT | Performed by: INTERNAL MEDICINE

## 2019-12-11 ENCOUNTER — TELEPHONE (OUTPATIENT)
Dept: INTERNAL MEDICINE CLINIC | Facility: CLINIC | Age: 64
End: 2019-12-11

## 2019-12-11 DIAGNOSIS — Z78.0 POST-MENOPAUSAL: Primary | ICD-10-CM

## 2019-12-11 NOTE — TELEPHONE ENCOUNTER
Pt requesting order for dexa scan and Pneumococcal -please enter then send to  so we can let pt know they are entered and sched appt

## 2019-12-13 ENCOUNTER — TELEPHONE (OUTPATIENT)
Dept: INTERNAL MEDICINE CLINIC | Facility: CLINIC | Age: 64
End: 2019-12-13

## 2019-12-13 NOTE — TELEPHONE ENCOUNTER
Pt is scheduled for a pneumonia vacc for   Future Appointments   Date Time Provider Deepti Harvey   12/17/2019 11:45 AM [facility] N[facility]SE [facility]

## 2019-12-17 ENCOUNTER — NURSE ONLY (OUTPATIENT)
Dept: INTERNAL MEDICINE CLINIC | Facility: CLINIC | Age: 64
End: 2019-12-17
Payer: COMMERCIAL

## 2019-12-17 PROCEDURE — 90471 IMMUNIZATION ADMIN: CPT | Performed by: INTERNAL MEDICINE

## 2019-12-17 PROCEDURE — 90670 PCV13 VACCINE IM: CPT | Performed by: INTERNAL MEDICINE

## 2019-12-21 ENCOUNTER — HOSPITAL ENCOUNTER (OUTPATIENT)
Dept: BONE DENSITY | Age: 64
Discharge: HOME | End: 2019-12-21
Attending: INTERNAL MEDICINE
Payer: COMMERCIAL

## 2019-12-21 DIAGNOSIS — Z78.0 POST-MENOPAUSAL: ICD-10-CM

## 2019-12-21 PROCEDURE — 77080 DXA BONE DENSITY AXIAL: CPT | Performed by: INTERNAL MEDICINE

## 2020-01-09 NOTE — ED NOTES
Medical Assistant Note:  Sae Yip presents today for lab draw.    Patient seen by provider today: Yes: Abby GILBERT.   present during visit today: Not Applicable.    Concerns: No Concerns.    Procedure:  Lab draw site: RAC, Needle type: BF, Gauge: 21. Gauze and coban applied    Post Assessment:  Labs drawn without difficulty: Yes.    Discharge Plan:  Departure Mode: Ambulatory.    Face to Face Time: 5.    Sheila Jaquez CMA             Sitting upright lunch tray given

## 2020-06-09 ENCOUNTER — TELEPHONE (OUTPATIENT)
Dept: INTERNAL MEDICINE CLINIC | Facility: CLINIC | Age: 65
End: 2020-06-09

## 2020-06-09 NOTE — TELEPHONE ENCOUNTER
Future Appointments   Date Time Provider Deepti Harvey   8/26/2020  1:45 PM Jennyfer Foley MD [facility]     Orders to edward- Pt informed that labs need to be completed no sooner than 2 weeks prior to the appt.  Pt aware to fast-no call ba

## 2020-07-22 ENCOUNTER — PATIENT MESSAGE (OUTPATIENT)
Dept: INTERNAL MEDICINE CLINIC | Facility: CLINIC | Age: 65
End: 2020-07-22

## 2020-07-22 DIAGNOSIS — Z13.0 SCREENING FOR BLOOD DISEASE: ICD-10-CM

## 2020-07-22 DIAGNOSIS — Z13.228 SCREENING FOR METABOLIC DISORDER: ICD-10-CM

## 2020-07-22 DIAGNOSIS — Z00.00 ROUTINE GENERAL MEDICAL EXAMINATION AT A HEALTH CARE FACILITY: Primary | ICD-10-CM

## 2020-07-22 DIAGNOSIS — Z13.220 SCREENING FOR LIPID DISORDERS: ICD-10-CM

## 2020-07-22 NOTE — TELEPHONE ENCOUNTER
From: Juan Pablo Candelaria  To: Erica Andrade MD  Sent: 7/22/2020 11:25 AM CDT  Subject: Test Results Question    Hello, I was trying to schedule an appointment for my blood work. I called the clinic and was told there was no tests ordered for me.  I was able

## 2020-07-27 ENCOUNTER — LAB ENCOUNTER (OUTPATIENT)
Dept: LAB | Age: 65
End: 2020-07-27
Attending: INTERNAL MEDICINE
Payer: COMMERCIAL

## 2020-07-27 DIAGNOSIS — Z00.00 ROUTINE GENERAL MEDICAL EXAMINATION AT A HEALTH CARE FACILITY: ICD-10-CM

## 2020-07-27 DIAGNOSIS — Z13.0 SCREENING FOR BLOOD DISEASE: ICD-10-CM

## 2020-07-27 DIAGNOSIS — Z13.220 SCREENING FOR LIPID DISORDERS: ICD-10-CM

## 2020-07-27 DIAGNOSIS — Z13.228 SCREENING FOR METABOLIC DISORDER: ICD-10-CM

## 2020-07-27 LAB
ALBUMIN SERPL-MCNC: 4 G/DL (ref 3.4–5)
ALBUMIN/GLOB SERPL: 1.1 {RATIO} (ref 1–2)
ALP LIVER SERPL-CCNC: 90 U/L (ref 50–130)
ALT SERPL-CCNC: 32 U/L (ref 13–56)
ANION GAP SERPL CALC-SCNC: 2 MMOL/L (ref 0–18)
AST SERPL-CCNC: 22 U/L (ref 15–37)
BASOPHILS # BLD AUTO: 0.06 X10(3) UL (ref 0–0.2)
BASOPHILS NFR BLD AUTO: 1.4 %
BILIRUB SERPL-MCNC: 0.8 MG/DL (ref 0.1–2)
BUN BLD-MCNC: 19 MG/DL (ref 7–18)
BUN/CREAT SERPL: 15.2 (ref 10–20)
CALCIUM BLD-MCNC: 9.4 MG/DL (ref 8.5–10.1)
CHLORIDE SERPL-SCNC: 106 MMOL/L (ref 98–112)
CHOLEST SMN-MCNC: 217 MG/DL (ref ?–200)
CO2 SERPL-SCNC: 32 MMOL/L (ref 21–32)
CREAT BLD-MCNC: 1.25 MG/DL (ref 0.55–1.02)
DEPRECATED RDW RBC AUTO: 44.8 FL (ref 35.1–46.3)
EOSINOPHIL # BLD AUTO: 0.17 X10(3) UL (ref 0–0.7)
EOSINOPHIL NFR BLD AUTO: 4 %
ERYTHROCYTE [DISTWIDTH] IN BLOOD BY AUTOMATED COUNT: 13.5 % (ref 11–15)
GLOBULIN PLAS-MCNC: 3.5 G/DL (ref 2.8–4.4)
GLUCOSE BLD-MCNC: 97 MG/DL (ref 70–99)
HCT VFR BLD AUTO: 48.9 % (ref 35–48)
HDLC SERPL-MCNC: 74 MG/DL (ref 40–59)
HGB BLD-MCNC: 15.5 G/DL (ref 12–16)
IMM GRANULOCYTES # BLD AUTO: 0.01 X10(3) UL (ref 0–1)
IMM GRANULOCYTES NFR BLD: 0.2 %
LDLC SERPL CALC-MCNC: 126 MG/DL (ref ?–100)
LYMPHOCYTES # BLD AUTO: 1.4 X10(3) UL (ref 1–4)
LYMPHOCYTES NFR BLD AUTO: 32.9 %
M PROTEIN MFR SERPL ELPH: 7.5 G/DL (ref 6.4–8.2)
MCH RBC QN AUTO: 28.6 PG (ref 26–34)
MCHC RBC AUTO-ENTMCNC: 31.7 G/DL (ref 31–37)
MCV RBC AUTO: 90.2 FL (ref 80–100)
MONOCYTES # BLD AUTO: 0.34 X10(3) UL (ref 0.1–1)
MONOCYTES NFR BLD AUTO: 8 %
NEUTROPHILS # BLD AUTO: 2.28 X10 (3) UL (ref 1.5–7.7)
NEUTROPHILS # BLD AUTO: 2.28 X10(3) UL (ref 1.5–7.7)
NEUTROPHILS NFR BLD AUTO: 53.5 %
NONHDLC SERPL-MCNC: 143 MG/DL (ref ?–130)
OSMOLALITY SERPL CALC.SUM OF ELEC: 292 MOSM/KG (ref 275–295)
PATIENT FASTING Y/N/NP: YES
PATIENT FASTING Y/N/NP: YES
PLATELET # BLD AUTO: 215 10(3)UL (ref 150–450)
POTASSIUM SERPL-SCNC: 4 MMOL/L (ref 3.5–5.1)
RBC # BLD AUTO: 5.42 X10(6)UL (ref 3.8–5.3)
SODIUM SERPL-SCNC: 140 MMOL/L (ref 136–145)
TRIGL SERPL-MCNC: 84 MG/DL (ref 30–149)
VLDLC SERPL CALC-MCNC: 17 MG/DL (ref 0–30)
WBC # BLD AUTO: 4.3 X10(3) UL (ref 4–11)

## 2020-07-27 PROCEDURE — 36415 COLL VENOUS BLD VENIPUNCTURE: CPT | Performed by: INTERNAL MEDICINE

## 2020-07-27 PROCEDURE — 80053 COMPREHEN METABOLIC PANEL: CPT | Performed by: INTERNAL MEDICINE

## 2020-07-27 PROCEDURE — 85025 COMPLETE CBC W/AUTO DIFF WBC: CPT | Performed by: INTERNAL MEDICINE

## 2020-07-27 PROCEDURE — 80061 LIPID PANEL: CPT | Performed by: INTERNAL MEDICINE

## 2020-07-28 ENCOUNTER — TELEPHONE (OUTPATIENT)
Dept: INTERNAL MEDICINE CLINIC | Facility: CLINIC | Age: 65
End: 2020-07-28

## 2020-07-28 DIAGNOSIS — Z12.31 ENCOUNTER FOR SCREENING MAMMOGRAM FOR MALIGNANT NEOPLASM OF BREAST: Primary | ICD-10-CM

## 2020-07-28 NOTE — TELEPHONE ENCOUNTER
Patient sent mychart schedule request requesting a mammogram order. Can you please place order to [facility] and notify patient when order is placed.      Future Appointments   Date Time Provider Deepti Harvey   8/26/2020  1:45 PM Governor MD José [facility]

## 2020-07-28 NOTE — TELEPHONE ENCOUNTER
Please call pt to let her know the mammogram has been ordered. Last Mammogram was 8/23/19. Thank you.

## 2020-08-26 ENCOUNTER — OFFICE VISIT (OUTPATIENT)
Dept: INTERNAL MEDICINE CLINIC | Facility: CLINIC | Age: 65
End: 2020-08-26
Payer: COMMERCIAL

## 2020-08-26 VITALS
DIASTOLIC BLOOD PRESSURE: 66 MMHG | SYSTOLIC BLOOD PRESSURE: 98 MMHG | BODY MASS INDEX: 30 KG/M2 | WEIGHT: 197 LBS | TEMPERATURE: 98 F | HEART RATE: 94 BPM

## 2020-08-26 DIAGNOSIS — Z90.49 S/P SMALL BOWEL RESECTION: ICD-10-CM

## 2020-08-26 DIAGNOSIS — K21.9 GASTROESOPHAGEAL REFLUX DISEASE, ESOPHAGITIS PRESENCE NOT SPECIFIED: ICD-10-CM

## 2020-08-26 DIAGNOSIS — F32.89 OTHER DEPRESSION: ICD-10-CM

## 2020-08-26 DIAGNOSIS — N32.81 OAB (OVERACTIVE BLADDER): ICD-10-CM

## 2020-08-26 DIAGNOSIS — Z00.00 ROUTINE GENERAL MEDICAL EXAMINATION AT A HEALTH CARE FACILITY: ICD-10-CM

## 2020-08-26 DIAGNOSIS — I10 ESSENTIAL HYPERTENSION: Primary | ICD-10-CM

## 2020-08-26 PROCEDURE — 99397 PER PM REEVAL EST PAT 65+ YR: CPT | Performed by: INTERNAL MEDICINE

## 2020-08-26 PROCEDURE — 99212 OFFICE O/P EST SF 10 MIN: CPT | Performed by: INTERNAL MEDICINE

## 2020-08-26 PROCEDURE — 3074F SYST BP LT 130 MM HG: CPT | Performed by: INTERNAL MEDICINE

## 2020-08-26 PROCEDURE — 3078F DIAST BP <80 MM HG: CPT | Performed by: INTERNAL MEDICINE

## 2020-08-26 NOTE — PROGRESS NOTES
HPI:    Patient ID: Alyssa Angulo is a 71year old female.     HPI  Here for pe, feels well, question on when due for gi fu, note forwarded to Dr. Susanna Sarah, depression on meds, controlled, oab, htn, pvd sp surgery and AAA repair  BP 98/66 (BP Location: [facility] well-developed and well-nourished. HENT:   Head: Normocephalic and atraumatic. Right Ear: External ear normal.   Left Ear: External ear normal.   Mouth/Throat: No oropharyngeal exudate. Eyes: Pupils are equal, round, and reactive to light.  Right eye

## 2020-08-28 RX ORDER — HYDROCHLOROTHIAZIDE 12.5 MG/1
TABLET ORAL
Qty: 90 TABLET | Refills: 1 | Status: SHIPPED | OUTPATIENT
Start: 2020-08-28 | End: 2021-02-04

## 2020-08-28 RX ORDER — LISINOPRIL 20 MG/1
TABLET ORAL
Qty: 90 TABLET | Refills: 1 | Status: SHIPPED | OUTPATIENT
Start: 2020-08-28 | End: 2021-02-04

## 2020-08-28 RX ORDER — TOLTERODINE 4 MG/1
CAPSULE, EXTENDED RELEASE ORAL
Qty: 90 CAPSULE | Refills: 3 | Status: SHIPPED | OUTPATIENT
Start: 2020-08-28 | End: 2021-08-10

## 2020-08-28 NOTE — TELEPHONE ENCOUNTER
Last Ov: 8/26/20, JL, CPE  Last labs: Lipid, CMP, CBC 7/27/20  Last Rx: tolterodine ER 4mg, #90, 3R 8/23/19    Future Appointments   Date Time Provider Deepti Harvey   9/2/2020 11:00 AM Angelica Elliott MD [facility]       Per Protocol - Tolterodin

## 2020-09-02 ENCOUNTER — OFFICE VISIT (OUTPATIENT)
Dept: INTERNAL MEDICINE CLINIC | Facility: CLINIC | Age: 65
End: 2020-09-02
Payer: COMMERCIAL

## 2020-09-02 ENCOUNTER — TELEPHONE (OUTPATIENT)
Dept: INTERNAL MEDICINE CLINIC | Facility: CLINIC | Age: 65
End: 2020-09-02

## 2020-09-02 VITALS
RESPIRATION RATE: 16 BRPM | HEART RATE: 78 BPM | SYSTOLIC BLOOD PRESSURE: 118 MMHG | DIASTOLIC BLOOD PRESSURE: 80 MMHG | WEIGHT: 197 LBS | BODY MASS INDEX: 30.92 KG/M2 | HEIGHT: 67 IN | TEMPERATURE: 98 F

## 2020-09-02 DIAGNOSIS — R63.5 WEIGHT GAIN: ICD-10-CM

## 2020-09-02 DIAGNOSIS — K21.9 GASTROESOPHAGEAL REFLUX DISEASE, ESOPHAGITIS PRESENCE NOT SPECIFIED: ICD-10-CM

## 2020-09-02 DIAGNOSIS — E66.9 OBESITY (BMI 30-39.9): ICD-10-CM

## 2020-09-02 DIAGNOSIS — Z90.49 S/P SMALL BOWEL RESECTION: ICD-10-CM

## 2020-09-02 DIAGNOSIS — Z51.81 ENCOUNTER FOR THERAPEUTIC DRUG MONITORING: Primary | ICD-10-CM

## 2020-09-02 PROCEDURE — 3074F SYST BP LT 130 MM HG: CPT | Performed by: INTERNAL MEDICINE

## 2020-09-02 PROCEDURE — 3008F BODY MASS INDEX DOCD: CPT | Performed by: INTERNAL MEDICINE

## 2020-09-02 PROCEDURE — 99213 OFFICE O/P EST LOW 20 MIN: CPT | Performed by: INTERNAL MEDICINE

## 2020-09-02 PROCEDURE — 3079F DIAST BP 80-89 MM HG: CPT | Performed by: INTERNAL MEDICINE

## 2020-09-02 RX ORDER — PEN NEEDLE, DIABETIC 30 GX3/16"
1 NEEDLE, DISPOSABLE MISCELLANEOUS DAILY
Qty: 90 EACH | Refills: 2 | Status: SHIPPED | OUTPATIENT
Start: 2020-09-02 | End: 2020-10-02

## 2020-09-02 NOTE — PROGRESS NOTES
HISTORY OF PRESENT ILLNESS  Patient presents with:  Weight Check: up 26 lb      Karla Swift is a 71year old female here for follow up in medical weight loss program.     Denies chest pain, shortness of breath, dizziness, blurred vision, headache, pa CREATSERUM 1.25 (H) 07/27/2020    ANIONGAP 2 07/27/2020    GFR 60 09/30/2017    GFRNAA 45 (L) 07/27/2020    GFRAA 52 (L) 07/27/2020    CA 9.4 07/27/2020    OSMOCALC 292 07/27/2020    ALKPHO 90 07/27/2020    AST 22 07/27/2020    ALT 32 07/27/2020    BILT 0. presence not specified    S/P small bowel resection    Other orders  -     Liraglutide -Weight Management (SAXENDA) 18 MG/3ML Subcutaneous Solution Pen-injector;  Inject 3 mg into the skin daily.  -     Insulin Pen Needle (PEN NEEDLES) 32G X 4 MM Does not a

## 2020-09-02 NOTE — PATIENT INSTRUCTIONS
SAXENDA    Week 1- 0.6mg daily. Week 2- 1.2mg daily. Week 3- 1.8mg daily. Week 4- 2.4mg daily. Week 5- 3mg daily.

## 2020-09-18 ENCOUNTER — PATIENT MESSAGE (OUTPATIENT)
Dept: INTERNAL MEDICINE CLINIC | Facility: CLINIC | Age: 65
End: 2020-09-18

## 2020-09-18 NOTE — TELEPHONE ENCOUNTER
From: Joanna Ventura  To: Anjana Judd MD  Sent: 9/18/2020 10:58 AM CDT  Subject: Visit Adriana Hughes, I was in your office a couple of weeks ago for my annual visit and you indicated that you would call in refills on all m

## 2020-09-21 RX ORDER — FLUTICASONE PROPIONATE 50 MCG
2 SPRAY, SUSPENSION (ML) NASAL DAILY
Qty: 16 G | Refills: 2 | Status: SHIPPED | OUTPATIENT
Start: 2020-09-21 | End: 2020-10-13

## 2020-09-26 DIAGNOSIS — E66.9 OBESITY (BMI 30-39.9): ICD-10-CM

## 2020-09-26 DIAGNOSIS — Z51.81 ENCOUNTER FOR THERAPEUTIC DRUG MONITORING: ICD-10-CM

## 2020-09-29 RX ORDER — BUPROPION HYDROCHLORIDE 300 MG/1
TABLET ORAL
Qty: 90 TABLET | Refills: 1 | Status: SHIPPED | OUTPATIENT
Start: 2020-09-29 | End: 2021-03-29

## 2020-09-29 RX ORDER — BUPROPION HYDROCHLORIDE 150 MG/1
TABLET ORAL
Qty: 90 TABLET | Refills: 1 | Status: SHIPPED | OUTPATIENT
Start: 2020-09-29 | End: 2021-03-29

## 2020-09-29 NOTE — TELEPHONE ENCOUNTER
Last Ov:8/26/20  Upcoming appt:none  Last labs:7/27/20 lipid, cmp, cbc  Last Rx:8/23/19 bupropion hcl ER  mg and 150mg    Per Protocol sent for review

## 2020-10-12 RX ORDER — LANSOPRAZOLE 30 MG/1
CAPSULE, DELAYED RELEASE ORAL
Qty: 180 CAPSULE | Refills: 3 | Status: SHIPPED | OUTPATIENT
Start: 2020-10-12 | End: 2021-09-22

## 2020-10-12 NOTE — TELEPHONE ENCOUNTER
Last Ov: 8/26/20, JL, CPE   Last labs: CBC, CMP, Lipid 7/27/20  Last Rx: lansoprazole 30mg, #180, 3R 10/28/19    No future appointments. Per Protocol - not on protocol, Rx pending.

## 2020-10-13 RX ORDER — FLUTICASONE PROPIONATE 50 MCG
SPRAY, SUSPENSION (ML) NASAL
Qty: 1 BOTTLE | Refills: 2 | Status: SHIPPED | OUTPATIENT
Start: 2020-10-13 | End: 2021-01-29

## 2020-12-23 ENCOUNTER — PATIENT MESSAGE (OUTPATIENT)
Dept: INTERNAL MEDICINE CLINIC | Facility: CLINIC | Age: 65
End: 2020-12-23

## 2020-12-23 RX ORDER — LIRAGLUTIDE 6 MG/ML
3 INJECTION, SOLUTION SUBCUTANEOUS DAILY
Qty: 5 PEN | Refills: 2 | Status: SHIPPED | OUTPATIENT
Start: 2020-12-23

## 2020-12-23 NOTE — TELEPHONE ENCOUNTER
Requesting Madhuri  LOV: 9/2/20  RTC: 6 weeks  Last Relevant Labs: na  Filled: 9/2/20 #5 pens with 2 refills    No future appointments. Overdue for visit. My chart sent.

## 2020-12-23 NOTE — TELEPHONE ENCOUNTER
From: Diaz Ferguson  To: Gayatri Richards MD  Sent: 12/23/2020 9:33 AM CST  Subject: Prescription Question    I tried to schedule a follow up appointment on line. But could not. Can you please send me an appointment for first available. I can come at any time.

## 2020-12-23 NOTE — TELEPHONE ENCOUNTER
Patient is now scheduled for first available. Will you give her refills until seen?     Requesting Madhuri  LOV: 9/2/20  RTC: 6 weeks  Last Relevant Labs: na  Filled: 9/2/20 #5 pens with 2 refills    Future Appointments   Date Time Provider Department Cent

## 2021-01-06 RX ORDER — LIRAGLUTIDE 6 MG/ML
INJECTION, SOLUTION SUBCUTANEOUS
Qty: 5 PEN | Refills: 0 | Status: SHIPPED
Start: 2021-01-06 | End: 2021-08-11

## 2021-01-06 NOTE — TELEPHONE ENCOUNTER
Future Appointments   Date Time Provider Deepti Harvey   2/2/2021 12:00 PM Jacy Abdi MD [facility]     She is scheduled. Do you want to refill?

## 2021-01-13 ENCOUNTER — APPOINTMENT (OUTPATIENT)
Dept: CARDIOLOGY | Age: 66
End: 2021-01-13

## 2021-01-21 ENCOUNTER — TELEPHONE (OUTPATIENT)
Dept: CARDIOLOGY | Age: 66
End: 2021-01-21

## 2021-01-26 ENCOUNTER — APPOINTMENT (OUTPATIENT)
Dept: CARDIOLOGY | Age: 66
End: 2021-01-26

## 2021-01-29 ENCOUNTER — TELEPHONE (OUTPATIENT)
Dept: INTERNAL MEDICINE CLINIC | Facility: CLINIC | Age: 66
End: 2021-01-29

## 2021-01-29 RX ORDER — FLUTICASONE PROPIONATE 50 MCG
SPRAY, SUSPENSION (ML) NASAL
Qty: 16 G | Refills: 1 | Status: SHIPPED | OUTPATIENT
Start: 2021-01-29 | End: 2021-05-24

## 2021-02-01 ENCOUNTER — PATIENT MESSAGE (OUTPATIENT)
Dept: INTERNAL MEDICINE CLINIC | Facility: CLINIC | Age: 66
End: 2021-02-01

## 2021-02-02 NOTE — TELEPHONE ENCOUNTER
From: Andrew Garcia  To: Mery Gongora MD  Sent: 2/1/2021 7:53 PM CST  Subject: Other    I would like an appointment to receive the COVID-19 vaccine.  Thanks

## 2021-02-04 RX ORDER — HYDROCHLOROTHIAZIDE 12.5 MG/1
TABLET ORAL
Qty: 90 TABLET | Refills: 1 | Status: SHIPPED | OUTPATIENT
Start: 2021-02-04 | End: 2021-08-10

## 2021-02-04 RX ORDER — LISINOPRIL 20 MG/1
TABLET ORAL
Qty: 90 TABLET | Refills: 1 | Status: SHIPPED | OUTPATIENT
Start: 2021-02-04 | End: 2021-08-13

## 2021-03-05 DIAGNOSIS — Z23 NEED FOR VACCINATION: ICD-10-CM

## 2021-03-16 ENCOUNTER — HOSPITAL ENCOUNTER (OUTPATIENT)
Dept: MAMMOGRAPHY | Age: 66
Discharge: HOME | End: 2021-03-16
Attending: INTERNAL MEDICINE
Payer: COMMERCIAL

## 2021-03-16 DIAGNOSIS — Z12.31 ENCOUNTER FOR SCREENING MAMMOGRAM FOR MALIGNANT NEOPLASM OF BREAST: ICD-10-CM

## 2021-03-16 PROCEDURE — 77067 SCR MAMMO BI INCL CAD: CPT | Performed by: INTERNAL MEDICINE

## 2021-03-16 PROCEDURE — 77063 BREAST TOMOSYNTHESIS BI: CPT | Performed by: INTERNAL MEDICINE

## 2021-03-29 DIAGNOSIS — Z51.81 ENCOUNTER FOR THERAPEUTIC DRUG MONITORING: ICD-10-CM

## 2021-03-29 DIAGNOSIS — E66.9 OBESITY (BMI 30-39.9): ICD-10-CM

## 2021-03-29 RX ORDER — BUPROPION HYDROCHLORIDE 150 MG/1
TABLET ORAL
Qty: 90 TABLET | Refills: 1 | Status: SHIPPED | OUTPATIENT
Start: 2021-03-29 | End: 2021-09-19

## 2021-03-29 RX ORDER — BUPROPION HYDROCHLORIDE 300 MG/1
TABLET ORAL
Qty: 90 TABLET | Refills: 1 | Status: SHIPPED | OUTPATIENT
Start: 2021-03-29 | End: 2021-09-19

## 2021-03-29 NOTE — TELEPHONE ENCOUNTER
Last VISIT 8/26/20 JL HTN     Last REFILL 9/29/20 Bupropion 90T 1R    Last LABS 7/27/20 Lipid, CMP, CBC    No future appointments. Per PROTOCOL? Bupropion not on protocol, Route to provider    Please Approve or Deny.

## 2021-05-23 RX ORDER — LIRAGLUTIDE 6 MG/ML
INJECTION, SOLUTION SUBCUTANEOUS
Refills: 0 | OUTPATIENT
Start: 2021-05-23

## 2021-05-23 NOTE — TELEPHONE ENCOUNTER
Requesting Madhuri  LOV: 9/2/20  RTC: 6 weeks  Last Relevant Labs: na  Filled: 1/6/21 #5 pens with 0 refills    No future appointments. appt 2/2/21 cancelled.   Denied refill with note must schedule

## 2021-05-24 RX ORDER — FLUTICASONE PROPIONATE 50 MCG
SPRAY, SUSPENSION (ML) NASAL
Qty: 3 BOTTLE | Refills: 1 | Status: SHIPPED | OUTPATIENT
Start: 2021-05-24

## 2021-06-01 ENCOUNTER — TELEPHONE (OUTPATIENT)
Dept: INTERNAL MEDICINE CLINIC | Facility: CLINIC | Age: 66
End: 2021-06-01

## 2021-06-01 NOTE — TELEPHONE ENCOUNTER
Received medical records request fax from Isma Liang. Requesting all records be sent. Copy sent to scan scat and to scanning for processing.

## 2021-08-10 RX ORDER — TOLTERODINE 4 MG/1
CAPSULE, EXTENDED RELEASE ORAL
Qty: 90 CAPSULE | Refills: 0 | Status: SHIPPED | OUTPATIENT
Start: 2021-08-10

## 2021-08-10 RX ORDER — HYDROCHLOROTHIAZIDE 12.5 MG/1
TABLET ORAL
Qty: 90 TABLET | Refills: 0 | Status: SHIPPED | OUTPATIENT
Start: 2021-08-10

## 2021-08-10 NOTE — TELEPHONE ENCOUNTER
Last VISIT 08/26/20    Last REFILL 08/28/20  TOLTERODINE TARTRATE ER 4 MG Oral Capsule SR 24 Hr 90 capsule 3     02/04/21   HYDROCHLOROTHIAZIDE 12.5 MG Oral Tab 90 tablet 1       Last LABS No recent labs done    No future appointments. Per PROTOCOL?  Ronny No

## 2021-08-12 NOTE — TELEPHONE ENCOUNTER
Last VISIT 8/26/20 JL HTN    Last REFILL 2/4/21 Lisinopril 90T 1R    Last LABS 7/27/20 Lipid, CMP, CBC    No future appointments. Per PROTOCOL? HTN protocol failed, Route to provider    Please Approve or Deny.

## 2021-08-13 RX ORDER — LISINOPRIL 20 MG/1
20 TABLET ORAL DAILY
Qty: 90 TABLET | Refills: 0 | Status: SHIPPED | OUTPATIENT
Start: 2021-08-13

## 2021-09-18 DIAGNOSIS — E66.9 OBESITY (BMI 30-39.9): ICD-10-CM

## 2021-09-18 DIAGNOSIS — Z51.81 ENCOUNTER FOR THERAPEUTIC DRUG MONITORING: ICD-10-CM

## 2021-09-18 NOTE — TELEPHONE ENCOUNTER
Been Following JL  Last OV 8/26/20   Last CPE 8/26/20   Last Labs CBC, CMP, Lipid 7/27/20     Last Rx fill Bupropion ER 150mg #90 1R 3/29/21    Bupropion ER 300mg #90 1R 3/29/21    No future appointments.     Per PROTOCOL neither on protocol    Please Appro

## 2021-09-19 RX ORDER — BUPROPION HYDROCHLORIDE 300 MG/1
TABLET ORAL
Qty: 15 TABLET | Refills: 0 | Status: SHIPPED | OUTPATIENT
Start: 2021-09-19

## 2021-09-19 RX ORDER — BUPROPION HYDROCHLORIDE 150 MG/1
TABLET ORAL
Qty: 15 TABLET | Refills: 0 | Status: SHIPPED | OUTPATIENT
Start: 2021-09-19

## 2021-09-23 RX ORDER — LANSOPRAZOLE 30 MG/1
30 CAPSULE, DELAYED RELEASE ORAL 2 TIMES DAILY
Qty: 60 CAPSULE | Refills: 0 | Status: SHIPPED | OUTPATIENT
Start: 2021-09-23

## 2021-09-23 NOTE — TELEPHONE ENCOUNTER
Been Following JL  Last OV 8/26/20  Last CPE 8/26/20  Last Labs CBC, CMP, Lipid 7/27/20    Last Rx fill Lansoprazole 30mg #180 3R 10/12/20    No future appointments. Per PROTOCOL not on protocol    Please Approve or Deny.

## 2021-09-24 ENCOUNTER — PATIENT MESSAGE (OUTPATIENT)
Dept: INTERNAL MEDICINE CLINIC | Facility: CLINIC | Age: 66
End: 2021-09-24

## 2021-09-24 NOTE — TELEPHONE ENCOUNTER
Letter sent to pt 8/19/21  Isabel message sent 9/24/21    [facility], please attempt to contact pt to schedule CPE

## 2021-10-06 NOTE — LETTER
August 31, 2018    [address]      Dear Rosario Mckenna:    Our office staff has made several attempts to contact you.  It is in our best judgment for your health and well-being that you contact our office regarding res
1.78

## 2021-11-01 RX ORDER — HYDROCHLOROTHIAZIDE 12.5 MG/1
TABLET ORAL
Qty: 90 TABLET | Refills: 0 | OUTPATIENT
Start: 2021-11-01

## 2021-11-01 RX ORDER — LISINOPRIL 20 MG/1
TABLET ORAL
Qty: 90 TABLET | Refills: 0 | OUTPATIENT
Start: 2021-11-01

## 2021-11-01 NOTE — TELEPHONE ENCOUNTER
Last VISIT - 8/2620 Physical    Last CPE - 8/26/20    Last REFILL -     HYDROCHLOROTHIAZIDE 12.5 MG Oral Tab 90 tablet 0 8/10/2021     lisinopril 20 MG Oral Tab 90 tablet 0 8/13/2021     Last LABS - 7/27/20 cmp,cbc, lipid    No future appointments. Per PROTOCOL? FAILED    Please Approve or Deny.

## 2021-12-30 NOTE — LETTER
09/23/19        Jacquelyn Muñoz [facility]      Dear Prachi Jacobson,    [facility] records indicate that you have outstanding lab work and or testing that was ordered for you and has not yet been completed:  Orders Placed This Encounter I have personally verified, reviewed, released, signed, authenticated, authorized, confirmed,finalized, and approved the actions of the CMA. Pt to present to ER if any worsening in symptoms.

## 2022-06-26 NOTE — PROGRESS NOTES
[facility]    Patient Name:  Bela Rios   YOB: 1955   Gender:  Female   Appt Date:  12/20/2018   Provider:  ACE Oneil   Insurance:  Marilou Melendez MD   Addres patient underwent duodenal resection with duodenojejunostomy on 12/4/18. Her hospital course was unremarkable. She was ambulating, tolerating a low fiber diet, and pain was controlled PO pain medication.  She was discharged home on POD #2.      The patient omeprazole 20 MG Oral Capsule Delayed Release, Take 20 mg by mouth daily. , Disp: , Rfl:   •  BuPROPion HCl ER, XL, 150 MG Oral Tablet 24 Hr, Take 1 tablet (150 mg total) by mouth daily. , Disp: 90 tablet, Rfl: 3  •  BuPROPion HCl ER, XL, 300 MG Oral Tablet bifurcation graft 8/27/2018   • Sleep disturbance    • Stress    • Unspecified essential hypertension    • Vomiting 07/18/2018    sometimes   • Weight gain     weight has gone up and down for years.       Reviewed:  Past Surgical History:   Procedure Aristeo Stevens noted. Some malodor present. Musculoskeletal: Extremities: no edema. Skin: Inspection and palpation: no jaundice.         Assessment / Plan:  Abdominal wound infection    -There is no overt signs of abdominal wound infection, however, patient has notice No

## 2023-08-31 NOTE — TELEPHONE ENCOUNTER
Note, screening order cannot be removed because it is currently scheduled. Gita Sainz at [address] said she is in on Friday, and will look for the updated order.
Order signed  thanks
US dept calling because they want the order changed from AA screen to Abdominal Aorta Complete  because she has had screens before for AAA. Last done 2014. The payment system will automatically bump it out because of the previous exams.     Pt is actually
[de-identified] : Patient is a new patient being referred from a colleague for persistent left knee pain he has had a cortisone injection with temporary relief as well and is no significant improvement with observation.  MRI from [facility] radiology is available for review patient states he has had discomfort in this knee for several years 
[de-identified] : Patient is a new patient being referred from a colleague for persistent left knee pain he has had a cortisone injection with temporary relief as well and is no significant improvement with observation.  MRI from [facility] radiology is available for review patient states he has had discomfort in this knee for several years

## (undated) DEVICE — 1200CC GUARDIAN II: Brand: GUARDIAN

## (undated) DEVICE — FOGARTY ARTERIAL EMBOLECTOMY CATHETER 4F 80CM: Brand: FOGARTY

## (undated) DEVICE — LAPAROTOMY SPONGE - RF AND X-RAY DETECTABLE PRE-WASHED: Brand: SITUATE

## (undated) DEVICE — ENDOPATH ECHELON ENDOSCOPIC LINEAR CUTTER RELOADS, BLUE, 60MM: Brand: ECHELON ENDOPATH

## (undated) DEVICE — HEMOCLIP HORIZON LG 004200

## (undated) DEVICE — STRYKER HARMONIC 23CM REPRCSED

## (undated) DEVICE — SUTURE PROLENE 5-0 RB-1

## (undated) DEVICE — SUTURE SILK 3-0

## (undated) DEVICE — DRAPE HALF 40X58 DYNJP2410

## (undated) DEVICE — TOWEL: OR BLU 80/CS: Brand: MEDICAL ACTION INDUSTRIES

## (undated) DEVICE — VIOLET BRAIDED (POLYGLACTIN 910), SYNTHETIC ABSORBABLE SUTURE: Brand: COATED VICRYL

## (undated) DEVICE — SUTURE SILK 2-0

## (undated) DEVICE — ECHELON FLEX POWERED PLUS COMPACT ARTICULATING ENDOSCOPIC LINEAR CUTTER, 60MM: Brand: ECHELON FLEX

## (undated) DEVICE — HEMOCLIP HORIZON MED 002200

## (undated) DEVICE — GEL AQUASONIC 100 20GR

## (undated) DEVICE — SUTURE SILK 3-0 SH

## (undated) DEVICE — REM POLYHESIVE ADULT PATIENT RETURN ELECTRODE: Brand: VALLEYLAB

## (undated) DEVICE — Device

## (undated) DEVICE — SUTURE PROLENE 4-0 RB-1

## (undated) DEVICE — SUTURE PROLENE 6-0 BV-1

## (undated) DEVICE — FOGARTY ARTERIAL EMBOLECTOMY CATHETER 3F 80CM: Brand: FOGARTY

## (undated) DEVICE — CHLORAPREP 26ML APPLICATOR

## (undated) DEVICE — DRAPE WARMER ORS-100

## (undated) DEVICE — PROXIMATE RH ROTATING HEAD SKIN STAPLERS (35 WIDE) CONTAINS 35 STAINLESS STEEL STAPLES: Brand: PROXIMATE

## (undated) DEVICE — 3M™ RED DOT™ MONITORING ELECTRODE WITH FOAM TAPE AND STICKY GEL, 50/BAG, 20/CASE, 72/PLT 2570: Brand: RED DOT™

## (undated) DEVICE — SUTURE PROLENE 5-0 C-1

## (undated) DEVICE — INTENDED TO BE USED TO OCCLUDE, RETRACT AND IDENTIFY ARTERIES, VEINS, TENDONS AND NERVES IN SURGICAL PROCEDURES: Brand: STERION®  VESSEL LOOP

## (undated) DEVICE — NEEDLE CONTRAST INTERJECT 25G

## (undated) DEVICE — AGENT HMST STRL KT MTRX THRMB

## (undated) DEVICE — TRAP 4 CPTR CHMBR N EZ INLN

## (undated) DEVICE — SOL  .9 1000ML BTL

## (undated) DEVICE — SUTURE PROLENE 4-0 V-5

## (undated) DEVICE — TRANSPOSAL ULTRAFLEX DUO/QUAD ULTRA CART MANIFOLD

## (undated) DEVICE — HIPEC PACK: Brand: MEDLINE INDUSTRIES, INC.

## (undated) DEVICE — CV PACK-LF: Brand: MEDLINE INDUSTRIES, INC.

## (undated) DEVICE — ZIMMER® STERILE DISPOSABLE TOURNIQUET CUFF WITH PLC, DUAL PORT, SINGLE BLADDER, 24 IN. (61 CM)

## (undated) DEVICE — SUTURE VICRYL 5-0 P-3

## (undated) DEVICE — Device: Brand: DEFENDO AIR/WATER/SUCTION AND BIOPSY VALVE

## (undated) DEVICE — ENDOSCOPY PACK UPPER: Brand: MEDLINE INDUSTRIES, INC.

## (undated) DEVICE — SUTURE VICRYL 3-0 SH

## (undated) DEVICE — SUTURE SILK 2-0 SH

## (undated) DEVICE — SUTURE PROLENE 3-0 SH

## (undated) DEVICE — PAD SACRAL SPAN AID

## (undated) DEVICE — SUTURE VICRYL 2-0 CT-1

## (undated) DEVICE — FILTERLINE NASAL ADULT O2/CO2

## (undated) DEVICE — CONVERTORS STOCKINETTE: Brand: CONVERTORS

## (undated) DEVICE — DRAPE EQUIPMENT INTRATEMP THER

## (undated) DEVICE — SUTURE PDS II 1 TP-1

## (undated) DEVICE — KENDALL SCD EXPRESS SLEEVES, KNEE LENGTH, MEDIUM: Brand: KENDALL SCD

## (undated) DEVICE — SUTURE PROLENE 3-0 V-7

## (undated) DEVICE — ALARIS STOPCOCK 4 WAY

## (undated) DEVICE — GAUZE SPONGES,12 PLY: Brand: CURITY

## (undated) DEVICE — HEX-LOCKING BLADE ELECTRODE: Brand: EDGE

## (undated) DEVICE — SUTURE PDS II 4-0 RB-1

## (undated) DEVICE — ESMARCH P/F TEXTURED 4" X 9' 10/BX

## (undated) DEVICE — HEMOCLIP MED 24 CLIP/CARTRIDGE

## (undated) DEVICE — PADDING CAST SOFT ROLL 6\" STER

## (undated) DEVICE — GLOVE SURG NEOLON SZ 7

## (undated) DEVICE — BANDAGE ROLL,100% COTTON, 6 PLY, LARGE: Brand: KERLIX

## (undated) DEVICE — SUTURE PROLENE 6-0 C-1

## (undated) DEVICE — PLASTC TOOMEY SYRNG DISP

## (undated) DEVICE — MEDI-VAC SUCTION FINE CAPACITY: Brand: CARDINAL HEALTH

## (undated) DEVICE — STERILE POLYISOPRENE POWDER-FREE SURGICAL GLOVES: Brand: PROTEXIS

## (undated) DEVICE — ENDOPATH ECHELON ENDOSCOPIC LINEAR CUTTER RELOADS, WHITE, 60MM: Brand: ECHELON ENDOPATH

## (undated) DEVICE — BASIC DOUBLE BASIN 1-LF: Brand: MEDLINE INDUSTRIES, INC.

## (undated) DEVICE — MEDI-VAC SUCTION HANDLE REGULAR CAPACITY: Brand: CARDINAL HEALTH

## (undated) DEVICE — SUTURE PROLENE 2-0 MH

## (undated) DEVICE — HEMOCLIP HORIZON SM MULTI

## (undated) DEVICE — GLOVE SURG TRIUMPH SZ 8

## (undated) DEVICE — 3M™ IOBAN™ 2 ANTIMICROBIAL INCISE DRAPE 6651EZ: Brand: IOBAN™ 2

## (undated) DEVICE — PROXIMATE RELOADABLE LINEAR STAPLER: Brand: PROXIMATE

## (undated) DEVICE — SUTURE SILK 4-0

## (undated) NOTE — ED AVS SNAPSHOT
Maribell Akhtar   MRN: [id number]    Department:  [facility] Emergency Department   Date of Visit:  7/19/2018           Disclosure     Insurance plans vary and the physician(s) referred by the ER may not be covered by your plan.  Please contact you tell this physician (or your personal doctor if your instructions are to return to your personal doctor) about any new or lasting problems. The primary care or specialist physician will see patients referred from the [facility] Emergency Department.  Arnel Altman

## (undated) NOTE — Clinical Note
NELDA, TCM call made, see notes. NCM attempted to schedule TCM HFU, patient will call after she gets her husbands work schedule to schedule an appointment. Message sent to MD's office.

## (undated) NOTE — LETTER
[facility]  Alisson Galo [address] [facility], [address] [place] [address]    Consent for Operation    Date: __________________    Time: _______________    1.  I authorize the performance upon Melyssa Dumont the following operation:    Procedure(s): Infrarenal abdominal procedure has been videotaped, the surgeon will obtain the original videotape. The hospital will not be responsible for storage or maintenance of this tape.     6. For the purpose of advancing medical education, I consent to the admittance of observers to t STATEMENTS REQUIRING INSERTION OR COMPLETION WERE FILLED IN.     Signature of Patient:   ___________________________    When the patient is a minor or mentally incompetent to give consent:  Signature of person authorized to consent for patient: ____________ drugs/illegal medications). Failure to inform my anesthesiologist about these medicines may increase my risk of anesthetic complications. · If I am allergic to anything or have had a reaction to anesthesia before.     3. I understand how the anesthesia med I have discussed the procedure and information above with the patient (or patient’s representative) and answered their questions. The patient or their representative has agreed to have anesthesia services.     _______________________________________________

## (undated) NOTE — MR AVS SNAPSHOT
[address] [phone]               Thank you for choosing us for your health care visit with Annabelle Jackson MD.  We are glad to serve you and happy to provide you with this summary of Tolterodine Tartrate ER 4 MG Cp24   TAKE 1 CAPSULE DAILY   Commonly known as:  DETROL LA           * topiramate 25 MG Tabs   Take 1 tablet (25 mg total) by mouth 2 (two) times daily. What changed:  Another medication with the same name was added.  Make s office, you can view your past visit information in [employer] by going to Visits < Visit Summaries. [employer] questions? Call [phone] for help. [employer] is NOT to be used for urgent needs. For medical emergencies, dial 911.            Visit [facility]

## (undated) NOTE — MR AVS SNAPSHOT
[place] [address] [phone]               Thank you for choosing us for your health care visit with Alexandr Pop MD.  We are glad to serve you and happy to provide you with this summary of Take 1 capsule (50,000 Units total) by mouth once a week. Commonly known as:  DRISDOL/VITAMIN D2           escitalopram 20 MG Tabs   Commonly known as:  LEXAPRO           hydrochlorothiazide 12.5 MG Tabs   Take 1 tablet (12.5 mg total) by mouth daily. Educational Information     Healthy Diet and Regular Exercise  [facility] [employer] [address] for making healthy food choices  -   Enjoy your food, but eat less. Fully enjoy your food when eating. Don’t eat while distracted and slow down.    Avoid

## (undated) NOTE — LETTER
06/18/18        Jacquelyn Zurita  [address] [facility] [address]      Dear Bonilla Lloyd,    [facility] records indicate that you have outstanding lab work and or testing that was ordered for you and has not yet been completed:          Sed Rate, Carlito (Au

## (undated) NOTE — LETTER
[facility]  Alisson Galo [address] [facility], [address] [place] [address]    Consent for Operation    Date: __________________    Time: _______________    1.  I authorize the performance upon Rafael Torres the following operation:    Procedure(s): abdominal aorta repai videotape. [facility] will not be responsible for storage or maintenance of this tape. 6. For the purpose of advancing medical education, I consent to the admittance of observers to the Operating Room.     7. I authorize the use of any specimen, organs Signature of Patient:   ___________________________    When the patient is a minor or mentally incompetent to give consent:  Signature of person authorized to consent for patient: ___________________________   Relationship to patient: _____________________ drugs/illegal medications). Failure to inform my anesthesiologist about these medicines may increase my risk of anesthetic complications. · If I am allergic to anything or have had a reaction to anesthesia before.     3. I understand how the anesthesia med I have discussed the procedure and information above with the patient (or patient’s representative) and answered their questions. The patient or their representative has agreed to have anesthesia services.     _______________________________________________

## (undated) NOTE — MR AVS SNAPSHOT
[facility] [address] [phone]               Thank you for choosing us for your health care visit with Kevyn Harrington MD.  We are glad to serve you and happy to provide you with this summ Routine general medical examination at a health care facility    -  Primary    B12 deficiency        Breast cancer screening          Instructions and Information about Your Health     None      Allergies as of May 15, 2017     No Known Allergies Call [phone] for help. [employer] is NOT to be used for urgent needs. For medical emergencies, dial 911.            Visit [facility] online at  [email or web address]

## (undated) NOTE — LETTER
[facility]  Abrahamtim Wisdomjocelyn [address] [facility], [address] [place] [address]    Consent for Operation    Date: __________________    Time: _______________    1.  I authorize the performance upon Bela Rios the following operation:    Left leg thromboembolectomy, right procedure has been videotaped, the surgeon will obtain the original videotape. The hospital will not be responsible for storage or maintenance of this tape.     6. For the purpose of advancing medical education, I consent to the admittance of observers to t STATEMENTS REQUIRING INSERTION OR COMPLETION WERE FILLED IN.     Signature of Patient:   ___________________________    When the patient is a minor or mentally incompetent to give consent:  Signature of person authorized to consent for patient: ____________ drugs/illegal medications). Failure to inform my anesthesiologist about these medicines may increase my risk of anesthetic complications. · If I am allergic to anything or have had a reaction to anesthesia before.     3. I understand how the anesthesia med I have discussed the procedure and information above with the patient (or patient’s representative) and answered their questions. The patient or their representative has agreed to have anesthesia services.     _______________________________________________

## (undated) NOTE — LETTER
08/31/20        Jacquelyn Ortez  [address] [facility] [address]      Dear Concepcion Jacobson,    [facility] records indicate that you have outstanding lab work and or testing that was ordered for you and has not yet been completed:  Orders Placed This Encounter

## (undated) NOTE — MR AVS SNAPSHOT
[facility] [address] [phone]               Thank you for choosing us for your health care visit with Berny Bee MD.  We are glad to serve you and happy to provide you with this summ Omeprazole 10 MG Cpdr   TAKE 1 CAPSULE DAILY           Phentermine HCl 37.5 MG Tabs   Take 1 tablet (37.5 mg total) by mouth every morning before breakfast.   Commonly known as:  ADIPEX-P           Tolterodine Tartrate ER 4 MG Cp24   TAKE 1 CAPSULE DAILY Imaging:  US ABDOMINAL AORTIC ANEURYSM SCREENING ([id number])    Instructions: To schedule an appointment for your radiology test please call [facility] Scheduling at [phone].          Referral Orders      Normal Orders This Visit    OP

## (undated) NOTE — Clinical Note
Ms. Baldev Staley said that Dr. Praveen Petty recommended earlier fu scope after her small bowel resection, when do you want to see her? Fei Acevedo

## (undated) NOTE — Clinical Note
Dick,I saw Ms. Bridgette De Los Santos for her pre op today. Please see my note, thanks for taking care of her. Amy Guzman

## (undated) NOTE — MR AVS SNAPSHOT
[address] [phone]               Thank you for choosing us for your health care visit with Misty Fernandes MD.  We are glad to serve you and happy to provide you with this summary of This list is accurate as of: 4/7/17 11:04 AM.  Always use your most recent med list.                BuPROPion HCl ER (XL) 300 MG Tb24   TAKE 1 TABLET DAILY   Commonly known as:  WELLBUTRIN XL           ergocalciferol 14131 units Caps   Commonly known as: Summaries. If you've been to the Emergency Department or your doctor's office, you can view your past visit information in [employer] by going to Visits < Visit Summaries. [employer] questions? Call [phone] for help.   [employer] is NOT to be used for urge

## (undated) NOTE — MR AVS SNAPSHOT
[address] [phone]               Thank you for choosing us for your health care visit with Elizabeth Bella MD.  We are glad to serve you and happy to provide you with this summary of Commonly known as:  LEXAPRO           hydrochlorothiazide 12.5 MG Tabs   Take 1 tablet (12.5 mg total) by mouth daily. Commonly known as:  HYDRODIURIL           lisinopril 20 MG Tabs   Take 1 tablet (20 mg total) by mouth daily.    Commonly known as:  LEV

## (undated) NOTE — LETTER
Felicitas Mishra [address] 6 [address]  [place], [address] [place] [address]    Consent for Operation  Date: __________________                                Time: _______________    1.  I authorize the performance upon Karla Swift the following operation:  Procedure procedure has been videotaped, the surgeon will obtain the original videotape. The hospital will not be responsible for storage or maintenance of this tape.   7. For the purpose of advancing medical education, I consent to the admittance of observers to the STATEMENTS REQUIRING INSERTION OR COMPLETION WERE FILLED IN.     Signature of Patient:   ___________________________    When the patient is a minor or mentally incompetent to give consent:  Signature of person authorized to consent for patient: ____________ supplements, and pills I can buy without a prescription (including street drugs/illegal medications). Failure to inform my anesthesiologist about these medicines may increase my risk of anesthetic complications. iv.  If I am allergic to anything or have ha Anesthesiologist Signature     Date   Time  I have discussed the procedure and information above with the patient (or patient’s representative) and answered their questions. The patient or their representative has agreed to have anesthesia services.     ___